# Patient Record
Sex: FEMALE | Race: BLACK OR AFRICAN AMERICAN | Employment: FULL TIME | URBAN - METROPOLITAN AREA
[De-identification: names, ages, dates, MRNs, and addresses within clinical notes are randomized per-mention and may not be internally consistent; named-entity substitution may affect disease eponyms.]

---

## 2018-04-13 ENCOUNTER — OFFICE VISIT (OUTPATIENT)
Dept: FAMILY MEDICINE CLINIC | Facility: CLINIC | Age: 38
End: 2018-04-13

## 2018-04-13 VITALS
HEART RATE: 105 BPM | SYSTOLIC BLOOD PRESSURE: 127 MMHG | DIASTOLIC BLOOD PRESSURE: 75 MMHG | BODY MASS INDEX: 50.02 KG/M2 | WEIGHT: 293 LBS | OXYGEN SATURATION: 98 % | HEIGHT: 64 IN | TEMPERATURE: 98.8 F | RESPIRATION RATE: 16 BRPM

## 2018-04-13 DIAGNOSIS — J30.2 PERENNIAL ALLERGIC RHINITIS WITH SEASONAL VARIATION: ICD-10-CM

## 2018-04-13 DIAGNOSIS — J30.89 PERENNIAL ALLERGIC RHINITIS WITH SEASONAL VARIATION: ICD-10-CM

## 2018-04-13 DIAGNOSIS — M54.50 ACUTE BILATERAL LOW BACK PAIN WITHOUT SCIATICA: ICD-10-CM

## 2018-04-13 DIAGNOSIS — E66.01 MORBID OBESITY WITH BMI OF 50.0-59.9, ADULT (HCC): ICD-10-CM

## 2018-04-13 DIAGNOSIS — B34.9 VIRAL SYNDROME: Primary | ICD-10-CM

## 2018-04-13 RX ORDER — CETIRIZINE HCL 10 MG
10 TABLET ORAL DAILY
Qty: 90 TAB | Refills: 3 | Status: SHIPPED | OUTPATIENT
Start: 2018-04-13 | End: 2021-03-02 | Stop reason: ALTCHOICE

## 2018-04-13 NOTE — PATIENT INSTRUCTIONS
Body Mass Index: Care Instructions  Your Care Instructions    Body mass index (BMI) can help you see if your weight is raising your risk for health problems. It uses a formula to compare how much you weigh with how tall you are. · A BMI lower than 18.5 is considered underweight. · A BMI between 18.5 and 24.9 is considered healthy. · A BMI between 25 and 29.9 is considered overweight. A BMI of 30 or higher is considered obese. If your BMI is in the normal range, it means that you have a lower risk for weight-related health problems. If your BMI is in the overweight or obese range, you may be at increased risk for weight-related health problems, such as high blood pressure, heart disease, stroke, arthritis or joint pain, and diabetes. If your BMI is in the underweight range, you may be at increased risk for health problems such as fatigue, lower protection (immunity) against illness, muscle loss, bone loss, hair loss, and hormone problems. BMI is just one measure of your risk for weight-related health problems. You may be at higher risk for health problems if you are not active, you eat an unhealthy diet, or you drink too much alcohol or use tobacco products. Follow-up care is a key part of your treatment and safety. Be sure to make and go to all appointments, and call your doctor if you are having problems. It's also a good idea to know your test results and keep a list of the medicines you take. How can you care for yourself at home? · Practice healthy eating habits. This includes eating plenty of fruits, vegetables, whole grains, lean protein, and low-fat dairy. · If your doctor recommends it, get more exercise. Walking is a good choice. Bit by bit, increase the amount you walk every day. Try for at least 30 minutes on most days of the week. · Do not smoke. Smoking can increase your risk for health problems. If you need help quitting, talk to your doctor about stop-smoking programs and medicines. These can increase your chances of quitting for good. · Limit alcohol to 2 drinks a day for men and 1 drink a day for women. Too much alcohol can cause health problems. If you have a BMI higher than 25  · Your doctor may do other tests to check your risk for weight-related health problems. This may include measuring the distance around your waist. A waist measurement of more than 40 inches in men or 35 inches in women can increase the risk of weight-related health problems. · Talk with your doctor about steps you can take to stay healthy or improve your health. You may need to make lifestyle changes to lose weight and stay healthy, such as changing your diet and getting regular exercise. If you have a BMI lower than 18.5  · Your doctor may do other tests to check your risk for health problems. · Talk with your doctor about steps you can take to stay healthy or improve your health. You may need to make lifestyle changes to gain or maintain weight and stay healthy, such as getting more healthy foods in your diet and doing exercises to build muscle. Where can you learn more? Go to http://kang-alberto.info/. Enter S176 in the search box to learn more about \"Body Mass Index: Care Instructions. \"  Current as of: October 13, 2016  Content Version: 11.4  © 4385-0545 [employer]. Care instructions adapted under license by [employer] (which disclaims liability or warranty for this information). If you have questions about a medical condition or this instruction, always ask your healthcare professional. [address] any warranty or liability for your use of this information.

## 2018-04-13 NOTE — PROGRESS NOTES
HISTORY OF PRESENT ILLNESS  Leonarda Buchanan is a 41 y.o. female. HPI Comments: Complains of bilateral back pain for the past 2 days. She had N/V/D 4 days ago, possibly with fever, but this resolved without treatment - she may have had an ill contact. She calls this back pain \"kidney pain,\" but denies dysuria, frequency or urgency. No longer feels febrile - no abdominal pain now. No medications for this. She needs a refill of Zyrtec for her allergies. Back Pain    Pertinent negatives include no chest pain, no fever, no headaches and no dysuria. Past Medical History:   Diagnosis Date    Allergic rhinitis     GERD (gastroesophageal reflux disease)     Vertigo        Past Surgical History:   Procedure Laterality Date    HX KNEE ARTHROSCOPY  2009    right knee    HX TONSILLECTOMY         History   Smoking Status    Former Smoker    Quit date: 10/9/2012   Smokeless Tobacco    Never Used     Current Outpatient Prescriptions   Medication Sig    cetirizine (ZYRTEC) 10 mg tablet Take 1 Tab by mouth daily. Indications: PERENNIAL ALLERGIC RHINITIS    meclizine (ANTIVERT) 25 mg tablet Take 1 Tab by mouth three (3) times daily as needed. Indications: VERTIGO     No current facility-administered medications for this visit. Review of Systems   Constitutional: Negative for chills and fever. HENT: Negative for congestion and sore throat. Respiratory: Negative for cough and shortness of breath. Cardiovascular: Negative for chest pain. Gastrointestinal: Positive for diarrhea, nausea and vomiting. Genitourinary: Negative for dysuria, frequency, hematuria and urgency. Musculoskeletal: Positive for back pain. Neurological: Negative for headaches. Endo/Heme/Allergies: Positive for environmental allergies.      Visit Vitals    /75    Pulse (!) 105    Temp 98.8 °F (37.1 °C) (Oral)    Resp 16    Ht 5' 4\" (1.626 m)    Wt 299 lb 12.8 oz (136 kg)    SpO2 98%    BMI 51.46 kg/m2 Physical Exam   Constitutional: She is oriented to person, place, and time. She appears well-developed and well-nourished. No distress. Neck: Neck supple. No thyromegaly present. Cardiovascular: Normal rate and regular rhythm. Exam reveals no gallop and no friction rub. No murmur heard. Pulmonary/Chest: Effort normal and breath sounds normal. No respiratory distress. Abdominal: Soft. She exhibits no mass. There is no tenderness. There is no CVA tenderness. Musculoskeletal:        Lumbar back: She exhibits normal range of motion, no tenderness and no spasm. Lymphadenopathy:     She has no cervical adenopathy. Neurological: She is alert and oriented to person, place, and time. Skin: Skin is warm and dry. Psychiatric: She has a normal mood and affect. Her behavior is normal. Judgment and thought content normal.       ASSESSMENT and PLAN    ICD-10-CM ICD-9-CM    1. Viral syndrome B34.9 079.99    2. Perennial allergic rhinitis with seasonal variation J30.89 477.9 cetirizine (ZYRTEC) 10 mg tablet    J30.2     3. Morbid obesity with BMI of 50.0-59.9, adult ([facility]) E66.01 278.01     Z68.43 V85.43    4. Acute bilateral low back pain without sciatica M54.5 724.2 CULTURE, URINE     338.19      Follow-up Disposition:  Return in about 6 months (around 10/13/2018). the following changes in treatment are made: I suspect that her back pain is residual myalgia from her viral infection rather than UTI, but will send urine culture and treat if positive. Push fluids, OTC pain control prn. Refilled Zyrtec.  lab results and schedule of future lab studies reviewed with patient  reviewed diet, exercise and weight control  reviewed medications and side effects in detail    Discussed the patient's BMI with her.   The BMI follow up plan is as follows:     dietary management education, guidance, and counseling  encourage exercise  monitor weight  prescribed dietary intake    An After Visit Summary was printed and given to the patient. Plan of care reviewed - patient verbalize(s) understanding and agreement.

## 2018-04-14 LAB — RESULT: NORMAL

## 2018-04-17 ENCOUNTER — OFFICE VISIT (OUTPATIENT)
Dept: FAMILY MEDICINE CLINIC | Facility: CLINIC | Age: 38
End: 2018-04-17

## 2018-04-17 VITALS
BODY MASS INDEX: 50.02 KG/M2 | OXYGEN SATURATION: 100 % | DIASTOLIC BLOOD PRESSURE: 76 MMHG | RESPIRATION RATE: 16 BRPM | TEMPERATURE: 98.1 F | SYSTOLIC BLOOD PRESSURE: 130 MMHG | WEIGHT: 293 LBS | HEIGHT: 64 IN | HEART RATE: 73 BPM

## 2018-04-17 DIAGNOSIS — R20.2 PARESTHESIA OF BOTH FEET: Primary | ICD-10-CM

## 2018-04-17 NOTE — PROGRESS NOTES
HISTORY OF PRESENT ILLNESS  Howard Horta is a 41 y.o. female. HPI Comments: Presents with 1 week history of numbness, tingling, burning and cold sensation in the soles of both feet. This is worse at night, but occurs sometimes during the day. No affected by weight bearing. No redness or swelling. No previous similar problems. No medications for this. Cold extremity   Pertinent negatives include no chest pain, no headaches and no shortness of breath. Tingling   Pertinent negatives include no chest pain, no headaches and no shortness of breath. Past Medical History:   Diagnosis Date    Allergic rhinitis     GERD (gastroesophageal reflux disease)     Vertigo        Past Surgical History:   Procedure Laterality Date    HX KNEE ARTHROSCOPY  2009    right knee    HX TONSILLECTOMY         History   Smoking Status    Former Smoker    Quit date: 10/9/2012   Smokeless Tobacco    Never Used     Current Outpatient Prescriptions   Medication Sig    cetirizine (ZYRTEC) 10 mg tablet Take 1 Tab by mouth daily. Indications: PERENNIAL ALLERGIC RHINITIS     No current facility-administered medications for this visit. Review of Systems   Constitutional: Negative for chills and fever. Respiratory: Negative for shortness of breath. Cardiovascular: Negative for chest pain. Gastrointestinal: Negative for nausea and vomiting. Musculoskeletal: Negative for back pain and neck pain. Skin: Negative for itching and rash. Neurological: Positive for dizziness (mild vertigo) and tingling. Negative for focal weakness and headaches. Visit Vitals    /76    Pulse 73    Temp 98.1 °F (36.7 °C)    Resp 16    Ht 5' 4\" (1.626 m)    Wt 301 lb (136.5 kg)    LMP 04/12/2018    SpO2 100%    BMI 51.67 kg/m2       Physical Exam   Constitutional: She is oriented to person, place, and time. She appears well-developed and well-nourished. No distress. Neck: Neck supple. No thyromegaly present. Cardiovascular: Normal rate, regular rhythm and intact distal pulses. Exam reveals no gallop and no friction rub. No murmur heard. Pulmonary/Chest: Effort normal and breath sounds normal. No respiratory distress. Musculoskeletal: She exhibits no edema. Feet appear normal   Lymphadenopathy:     She has no cervical adenopathy. Neurological: She is alert and oriented to person, place, and time. Skin: Skin is warm and dry. Psychiatric: She has a normal mood and affect. Her behavior is normal. Judgment and thought content normal.       ASSESSMENT and PLAN    ICD-10-CM ICD-9-CM    1. Paresthesia of both feet R20.2 782.0 VITAMIN B12 & FOLATE      HEMOGLOBIN A1C WITH EAG      TSH 3RD GENERATION      REFERRAL TO PODIATRY     Follow-up Disposition:  Return if symptoms worsen or fail to improve.  lab results and schedule of future lab studies reviewed with patient  Referral to Podiatry (rule out tarsal tunnel syndrome). Plan of care reviewed - patient verbalize(s) understanding and agreement.

## 2018-04-17 NOTE — PATIENT INSTRUCTIONS
Numbness and Tingling: Care Instructions  Your Care Instructions    Many things can cause numbness or tingling. Swelling may put pressure on a nerve. This could cause you to lose feeling or have a pins-and-needles sensation on part of your body. Nerves may be damaged from trauma, toxins, or diseases, such as diabetes or multiple sclerosis (MS). Sometimes, though, the cause is not clear. If there is no clear reason for your symptoms, and you are not having any other symptoms, your doctor may suggest watching and waiting for a while to see if the numbness or tingling goes away on its own. Your doctor may want you to have blood or nerve tests to find the cause of your symptoms. Follow-up care is a key part of your treatment and safety. Be sure to make and go to all appointments, and call your doctor if you are having problems. It's also a good idea to know your test results and keep a list of the medicines you take. How can you care for yourself at home? · If your doctor prescribes medicine, take it exactly as directed. Call your doctor if you think you are having a problem with your medicine. · If you have any swelling, put ice or a cold pack on the area for 10 to 20 minutes at a time. Put a thin cloth between the ice and your skin. When should you call for help? Call 911 anytime you think you may need emergency care. For example, call if:  ? · You have weakness, numbness, or tingling in both legs. ? · You lose bowel or bladder control. ? · You have symptoms of a stroke. These may include:  ¨ Sudden numbness, tingling, weakness, or loss of movement in your face, arm, or leg, especially on only one side of your body. ¨ Sudden vision changes. ¨ Sudden trouble speaking. ¨ Sudden confusion or trouble understanding simple statements. ¨ Sudden problems with walking or balance. ¨ A sudden, severe headache that is different from past headaches. ? Watch closely for changes in your health, and be sure to contact your doctor if you have any problems, or if:  ? · You do not get better as expected. Where can you learn more? Go to http://kang-alberto.info/. Enter V879 in the search box to learn more about \"Numbness and Tingling: Care Instructions. \"  Current as of: October 14, 2016  Content Version: 11.4  © 3898-3903 [facility]. Care instructions adapted under license by [employer] (which disclaims liability or warranty for this information). If you have questions about a medical condition or this instruction, always ask your healthcare professional. [place] [address] any warranty or liability for your use of this information.

## 2018-04-18 ENCOUNTER — TELEPHONE (OUTPATIENT)
Dept: FAMILY MEDICINE CLINIC | Facility: CLINIC | Age: 38
End: 2018-04-18

## 2018-06-26 ENCOUNTER — OFFICE VISIT (OUTPATIENT)
Dept: FAMILY MEDICINE CLINIC | Facility: CLINIC | Age: 38
End: 2018-06-26

## 2018-06-26 VITALS
RESPIRATION RATE: 18 BRPM | WEIGHT: 293 LBS | TEMPERATURE: 98.3 F | HEIGHT: 64 IN | HEART RATE: 89 BPM | DIASTOLIC BLOOD PRESSURE: 80 MMHG | SYSTOLIC BLOOD PRESSURE: 125 MMHG | BODY MASS INDEX: 50.02 KG/M2 | OXYGEN SATURATION: 97 %

## 2018-06-26 DIAGNOSIS — Z02.0 SCHOOL PHYSICAL EXAM: Primary | ICD-10-CM

## 2018-06-26 DIAGNOSIS — Z11.1 SCREENING-PULMONARY TB: ICD-10-CM

## 2018-06-26 NOTE — PROGRESS NOTES
HISTORY OF PRESENT ILLNESS  Rui Calderon is a 41 y.o. female. 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 She is required to have a PPD for this. 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 She is feeling well, taking only her allergy medication. Non-smoker. Physical   Pertinent negatives include no chest pain, no headaches and no shortness of breath. PPD Reading   Pertinent negatives include no chest pain, no headaches and no shortness of breath. Past Medical History:   Diagnosis Date    Allergic rhinitis     GERD (gastroesophageal reflux disease)     Vertigo        Past Surgical History:   Procedure Laterality Date    HX KNEE ARTHROSCOPY  2009    right knee    HX TONSILLECTOMY         History   Smoking Status    Former Smoker    Quit date: 10/9/2012   Smokeless Tobacco    Never Used     Current Outpatient Prescriptions   Medication Sig    cetirizine (ZYRTEC) 10 mg tablet Take 1 Tab by mouth daily. Indications: PERENNIAL ALLERGIC RHINITIS     No current facility-administered medications for this visit. Review of Systems   Constitutional: Negative for chills and fever. Respiratory: Negative for cough and shortness of breath. Cardiovascular: Negative for chest pain. Gastrointestinal: Negative for nausea and vomiting. Musculoskeletal: Negative for myalgias. Skin: Negative for itching and rash. Neurological: Negative for dizziness and headaches. Visit Vitals    /80 (BP 1 Location: Right arm, BP Patient Position: Sitting)    Pulse 89    Temp 98.3 °F (36.8 °C) (Oral)    Resp 18    Ht 5' 4\" (1.626 m)    Wt 298 lb (135.2 kg)    SpO2 97%    BMI 51.15 kg/m2       Physical Exam   Constitutional: She is oriented to person, place, and time. She appears well-developed and well-nourished. No distress. Neck: Neck supple. No thyromegaly present.    Cardiovascular: Normal rate, regular rhythm and intact distal pulses. Exam reveals no gallop and no friction rub. No murmur heard. Pulmonary/Chest: Effort normal and breath sounds normal. No respiratory distress. Musculoskeletal: She exhibits no edema. Lymphadenopathy:     She has no cervical adenopathy. Neurological: She is alert and oriented to person, place, and time. Skin: Skin is warm and dry. Psychiatric: She has a normal mood and affect. Her behavior is normal. Judgment and thought content normal.       ASSESSMENT and PLAN    ICD-10-CM ICD-9-CM    1. School physical exam Z02.0 V70.5    2. Screening-pulmonary TB Z11.1 V74.1 AMB POC TUBERCULOSIS, INTRADERMAL (SKIN TEST)     Follow-up Disposition:  Return if symptoms worsen or fail to improve. 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 reviewed diet, exercise and weight control  Plan of care reviewed - patient verbalize(s) understanding and agreement.

## 2018-06-26 NOTE — PROGRESS NOTES
Chief Complaint   Patient presents with    Physical     for work    PPD Reading     request to get PPD       Pt preferred language for health care discussion is english. Is someone accompanying this pt? no    Is the patient using any DME equipment during OV? no    Depression Screening:  PHQ over the last two weeks 6/26/2018   Little interest or pleasure in doing things Not at all   Feeling down, depressed or hopeless Not at all   Total Score PHQ 2 0       Learning Assessment:  Learning Assessment 6/26/2018   PRIMARY LEARNER Patient   HIGHEST LEVEL OF EDUCATION - PRIMARY LEARNER  -   BARRIERS PRIMARY LEARNER -   CO-LEARNER CAREGIVER -   PRIMARY LANGUAGE ENGLISH   LEARNER PREFERENCE PRIMARY DEMONSTRATION   ANSWERED BY patient   RELATIONSHIP SELF       Health Maintenance reviewed and discussed per provider. Yes    Pt is due for There are no preventive care reminders to display for this patient. .  Please order/place referral if appropriate. Coordination of Care:  1. Have you been to the ER, urgent care clinic since your last visit? Hospitalized since your last visit? n0    2. Have you seen or consulted any other health care providers outside of the [address] [place] [address] since your last visit? Include any pap smears or colon screening. Yes, patient state, \"I've gone to GYN at [address] \"

## 2018-06-26 NOTE — PATIENT INSTRUCTIONS
Tuberculin Skin Test: Care Instructions  Your Care Instructions    Tuberculosis (TB) is a bacterial infection that can damage the lungs or other parts of the body. The TB skin test can tell if you have TB bacteria in your body. Many people are exposed to TB and test positive for TB bacteria in their bodies, but they don't get the disease. TB bacteria can stay in your body without making you sick. This is because your immune system can keep TB in check. Your doctor may want you to have a TB skin test if you have been in close contact with someone who has TB. Or you may need the test if you have symptoms that might be caused by TB, such as a cough that does not go away, a fever, or weight loss. You also may get the test if you are a health care worker. During the skin test, part of a TB bacterium is injected under your skin. The test will feel like a skin prick. If you have TB bacteria in your body, a firm red bump will form at the shot site within 2 days. If the test shows that you are infected with TB (positive), your doctor probably will order more tests. A TB-positive skin test can't tell when you became infected with TB. And it can't tell whether the infection can be passed to others. Follow-up care is a key part of your treatment and safety. Be sure to make and go to all appointments, and call your doctor if you are having problems. It's also a good idea to know your test results and keep a list of the medicines you take. How can you care for yourself at home? · Do not scratch the test site. Scratching it may cause redness or swelling. This could affect the test results. · To ease itching, put a cold washcloth on the site. Then pat the site dry. · Do not cover the test site with a bandage or other dressing. · Go back to your doctor's office or hospital to have the test read on the follow-up date. This must be done between 48 and 72 hours after you get the shot. When should you call for help?   Watch closely for changes in your health, and be sure to contact your doctor if you have any problems. Where can you learn more? Go to http://kang-alberto.info/. Enter [phone] in the search box to learn more about \"Tuberculin Skin Test: Care Instructions. \"  Current as of: March 3, 2017  Content Version: 11.4  © 5560-8459 [employer]. Care instructions adapted under license by [employer] (which disclaims liability or warranty for this information). If you have questions about a medical condition or this instruction, always ask your healthcare professional. [place] [address] any warranty or liability for your use of this information.

## 2018-06-29 ENCOUNTER — CLINICAL SUPPORT (OUTPATIENT)
Dept: FAMILY MEDICINE CLINIC | Facility: CLINIC | Age: 38
End: 2018-06-29

## 2018-06-29 DIAGNOSIS — Z11.1 SCREENING-PULMONARY TB: Primary | ICD-10-CM

## 2018-06-29 LAB
MM INDURATION POC: 0 MM (ref 0–5)
PPD POC: NORMAL NEGATIVE

## 2018-06-29 NOTE — LETTER
6/29/2018 10:04 AM 
 
Ms. Calvin Lebron [address] [facility] [address] I performed a physical exam on 6/26/18, and a skin test for TB was placed. This test was read today (negative). She is free from contagious disease and fit for employment. Sincerely, Renan PURDY Nurse

## 2018-12-05 ENCOUNTER — OFFICE VISIT (OUTPATIENT)
Dept: FAMILY MEDICINE CLINIC | Facility: CLINIC | Age: 38
End: 2018-12-05

## 2018-12-05 VITALS
BODY MASS INDEX: 50.02 KG/M2 | HEART RATE: 94 BPM | HEIGHT: 64 IN | TEMPERATURE: 97.9 F | SYSTOLIC BLOOD PRESSURE: 133 MMHG | WEIGHT: 293 LBS | RESPIRATION RATE: 16 BRPM | OXYGEN SATURATION: 90 % | DIASTOLIC BLOOD PRESSURE: 90 MMHG

## 2018-12-05 DIAGNOSIS — J06.9 VIRAL UPPER RESPIRATORY TRACT INFECTION: Primary | ICD-10-CM

## 2018-12-05 NOTE — LETTER
[facility] 
[facility] MAIN OFFICE 
[address] [place] 
[phone] Work/School Note Date: 12/5/2018 To Whom It May concern: 
 
Leydi Romero was seen and treated today in the office Leydi Romero may return to work on 12/07/2018. Sincerely, Karo Hurtado MD

## 2018-12-05 NOTE — PROGRESS NOTES
HISTORY OF PRESENT ILLNESS Dixie Coombs is a 41 y.o. female. This is a 41year old female  Presenting with 3 days of a sore throat that progressed to cough over one day with, minimal mucous or phlegm production. 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 Negative for fever, malaise/fatigue and weight loss. HENT: Negative. Eyes: Negative for blurred vision, double vision and photophobia. Respiratory: Negative. Cardiovascular: Negative for chest pain, palpitations and orthopnea. Gastrointestinal:  
     There is no history of nausea The patient denies vomiting There is no history of diarrhea or constipation The patient denies heartburn Skin: Negative. Neurological: Negative. Negative for weakness. Psychiatric/Behavioral: Negative. Physical Exam  
Constitutional: She is oriented to person, place, and time. She appears well-developed and well-nourished. HENT:  
Head: Normocephalic and atraumatic. Right Ear: External ear normal.  
Left Ear: External ear normal.  
Nose: Nose normal.  
Mouth/Throat: Oropharynx is clear and moist.  
Eyes: Conjunctivae and EOM are normal. Pupils are equal, round, and reactive to light. Neck: Normal range of motion. Neck supple. No JVD present. No tracheal deviation present. No thyromegaly present. Cardiovascular: Regular rhythm, normal heart sounds and intact distal pulses. Pulmonary/Chest: Effort normal. No stridor. No respiratory distress. She has no wheezes. She has no rales. She exhibits no tenderness. Frequent cough,nasal congestion Abdominal: Soft.  Bowel sounds are normal. She exhibits no distension and no mass. There is no tenderness. There is no rebound and no guarding. Musculoskeletal: Normal range of motion. She exhibits no edema. Lymphadenopathy:  
  She has no cervical adenopathy. Neurological: She is alert and oriented to person, place, and time. Skin: Skin is warm and dry. No rash noted. No erythema. Psychiatric: She has a normal mood and affect. Her behavior is normal. Judgment normal.  
Nursing note and vitals reviewed. MDM Number of Diagnoses or Management Options Viral upper respiratory tract infection: new, no workup Risk of Complications, Morbidity, and/or Mortality Presenting problems: low ASSESSMENT and PLAN 
  ICD-10-CM ICD-9-CM 1. Viral upper respiratory tract infection J06.9 465.9   
 symptomatic treatment Off work letter Follow-up Disposition: 
Return in about 3 months (around 3/5/2019), or if symptoms worsen or fail to improve.

## 2018-12-05 NOTE — PROGRESS NOTES
Chief Complaint Patient presents with  Cold Symptoms  
  pt presents for cough and congestion pt states \" that it started with sore throat on Saturday sore throat is gone just congestion and cough \"

## 2018-12-05 NOTE — LETTER
[facility] 
[facility] MAIN OFFICE 
[facility] [address] [place] 
[phone] Work/School Note Date: 12/5/2018 To Whom It May concern: 
 
Lulú Sim was seen and treated today in the office. Lulú Sim may return to work on 12/08/2018. Sincerely, Elsy Hunter MD

## 2018-12-05 NOTE — PATIENT INSTRUCTIONS

## 2020-08-30 NOTE — PROGRESS NOTES
Consent: Diana Maier, who was seen by synchronous (real-time) audio-video technology, and/or her healthcare decision maker, is aware that this patient-initiated, Telehealth encounter on 8/31/2020 is a billable service, with coverage as determined by her insurance carrier. She is aware that she may receive a bill and has provided verbal consent to proceed: Yes. The patient was at home and I was at the offices of the [address] [place] [address] no one else participated in the service. ASSESSMENT and PLAN    ICD-10-CM ICD-9-CM    1. Morbid obesity with BMI of 50.0-59.9, adult ([facility]  E66.01 278.01     Z68.43 V85.43     We discussed diet and exercise. 2. Skin rash  R21 782.1 ketoconazole (NIZORAL) 2 % shampoo      triamcinolone (ARISTOCORT) 0.5 % topical cream    Aveeno, Nizoral daily Aristocort cream twice daily. Call in 1 week if not improved. Differential diagnosis for skin rash includes seborrhea, contact reaction doubt psoriasis or other etiologies. .  Health Maintenance Due   Topic Date Due    PAP AKA CERVICAL CYTOLOGY  11/16/2019     [id number]  Subjective:   Diana Maier is a 47 y.o. female who was seen for follow-up. Skin rash  Today she has concerns about her skin breaking out. The onset was October. She describes it as a flaking sensation. It is primarily on the face and the neck. No flexural or extensor area discomforts. There is no history of eczema or history of psoriasis. She has not changed anything since October. She has however tried several things including Aveeno and some exfoliates. She states the dose so causes burning. She has no known allergies. She works as a massage therapist.  No fever or chills are admitted to. Overweight  The weight has remained the same. She is added tissue oils and all of oil. We discussed diet. body mass index is unknown because there is no height or weight on file.   Wt Readings from Last 3 Encounters:   12/05/18 294 lb (133.4 kg)   06/26/18 298 lb (135.2 kg)   04/17/18 301 lb (136.5 kg)     Key Obesity Meds     Patient is on no anti-obesity meds. Current Outpatient Medications   Medication Sig    cetirizine (ZYRTEC) 10 mg tablet Take 1 Tab by mouth daily. Indications: PERENNIAL ALLERGIC RHINITIS     No current facility-administered medications for this visit. Allergies   Allergen Reactions    Peanut Itching and Swelling    Tree Nut Itching and Swelling     has Morbid obesity with BMI of 50.0-59.9, adult (HCC), GERD (gastroesophageal reflux disease), and Perennial allergic rhinitis with seasonal variation on their problem list.  Past Surgical History:   Procedure Laterality Date    HX KNEE ARTHROSCOPY  2009    right knee    HX TONSILLECTOMY       Relationships   Social connections    Talks on phone: Not on file    Gets together: Not on file    Attends Latter day service: Not on file    Active member of club or organization: Not on file    Attends meetings of clubs or organizations: Not on file    Relationship status: Not on file     family history includes Asthma in her mother; Diabetes in her mother; High Cholesterol in her mother; Hypertension in her mother; Obesity in her mother. Review of Systems   Constitutional: Negative for chills and fever. HENT: Negative. Eyes: Negative for blurred vision, double vision, photophobia and redness. Respiratory: Negative. Negative for shortness of breath and wheezing. Cardiovascular: Negative for orthopnea and leg swelling. Gastrointestinal: Negative for constipation and diarrhea. There is no history of nausea  The patient denies vomiting  There is no history of diarrhea or constipation  The patient denies heartburn     Genitourinary: Negative for dysuria and urgency. Musculoskeletal: Negative for myalgias. Skin: Positive for itching and rash. Neurological: Negative.   Negative for dizziness, sensory change, speech change, focal weakness and weakness. Endo/Heme/Allergies: Does not bruise/bleed easily. Psychiatric/Behavioral: Negative. The patient is not nervous/anxious. Physical Exam  Constitutional:       General: She is not in acute distress. HENT:      Right Ear: External ear normal.      Left Ear: External ear normal.      Mouth/Throat:      Mouth: Mucous membranes are moist.      Pharynx: Oropharynx is clear. Eyes:      General: No scleral icterus. Pupils: Pupils are equal, round, and reactive to light. Neck:      Musculoskeletal: No neck rigidity. Pulmonary:      Effort: Pulmonary effort is normal.   Musculoskeletal:         General: No swelling. Right lower leg: No edema. Left lower leg: No edema. Skin:     General: Skin is dry. Coloration: Skin is not pale. Findings: No erythema. Comments: Fine scaling of the face and some slight hyperpigmentation of the neck bilaterally. Flexural creases are normal.   Neurological:      Mental Status: She is alert and oriented to person, place, and time. Gait: Gait normal.   Psychiatric:         Mood and Affect: Mood normal.         Behavior: Behavior normal.         Thought Content: Thought content normal.        Results for orders placed or performed in visit on 06/26/18   AMB POC TUBERCULOSIS, INTRADERMAL (SKIN TEST)   Result Value Ref Range    PPD  Negative    mm Induration 0 mm     No results found for any visits on 08/31/20. We discussed the expected course, resolution and complications of the diagnosis(es) in detail. Medication risks, benefits, costs, interactions, and alternatives were discussed as indicated. I advised her to contact the office if her condition worsens, changes or fails to improve as anticipated. She expressed understanding with the diagnosis(es) and plan. Claude Doom is a 47 y.o. female being evaluated by a video visit encounter for concerns as above. A caregiver was present when appropriate.  Due to this being a TeleHealth encounter (During AFHTW-31 public health emergency), evaluation of the following organ systems was limited: Vitals/Constitutional/EENT/Resp/CV/GI//MS/Neuro/Skin/Heme-Lymph-Imm. Pursuant to the emergency declaration under the [facility], [facility] waiver authority and the [employer] and [employer] Act, this Virtual  Visit was conducted, with patient's (and/or legal guardian's) consent, to reduce the patient's risk of exposure to COVID-19 and provide necessary medical care. Arnav Pérez MD    This note was done with the assistance of dragon speech software.   Some inadvertent errors or omissions may be present

## 2020-08-31 ENCOUNTER — VIRTUAL VISIT (OUTPATIENT)
Dept: FAMILY MEDICINE CLINIC | Facility: CLINIC | Age: 40
End: 2020-08-31

## 2020-08-31 DIAGNOSIS — E66.01 MORBID OBESITY WITH BMI OF 50.0-59.9, ADULT (HCC): Primary | ICD-10-CM

## 2020-08-31 DIAGNOSIS — R21 SKIN RASH: ICD-10-CM

## 2020-08-31 RX ORDER — KETOCONAZOLE 20 MG/ML
SHAMPOO TOPICAL
Qty: 120 ML | Refills: 5 | Status: SHIPPED | OUTPATIENT
Start: 2020-08-31 | End: 2021-08-02

## 2020-08-31 RX ORDER — TRIAMCINOLONE ACETONIDE 5 MG/G
CREAM TOPICAL 2 TIMES DAILY
Qty: 60 G | Refills: 3 | Status: SHIPPED | OUTPATIENT
Start: 2020-08-31 | End: 2021-08-02

## 2021-03-02 ENCOUNTER — VIRTUAL VISIT (OUTPATIENT)
Dept: FAMILY MEDICINE CLINIC | Age: 41
End: 2021-03-02
Payer: COMMERCIAL

## 2021-03-02 DIAGNOSIS — J06.9 UPPER RESPIRATORY TRACT INFECTION, UNSPECIFIED TYPE: Primary | ICD-10-CM

## 2021-03-02 DIAGNOSIS — R21 SKIN RASH: ICD-10-CM

## 2021-03-02 DIAGNOSIS — E66.01 MORBID OBESITY WITH BMI OF 50.0-59.9, ADULT (HCC): ICD-10-CM

## 2021-03-02 PROCEDURE — 99443 PR PHYS/QHP TELEPHONE EVALUATION 21-30 MIN: CPT | Performed by: EMERGENCY MEDICINE

## 2021-03-02 RX ORDER — SODIUM SULFIDE 10 MG/G
15 GEL TOPICAL 4 TIMES DAILY
Qty: 236 ML | Refills: 0 | Status: SHIPPED | OUTPATIENT
Start: 2021-03-02 | End: 2021-08-10

## 2021-03-02 NOTE — PROGRESS NOTES
Consent: Krupa Mckoy, who was evaluated by audio only technology, and/or her healthcare decision maker, is aware that this patient-initiated, Telehealth audio encounter on 3/2/2021 is a billable service, with coverage as determined by her insurance carrier. She is aware that she may receive a bill and has provided verbal consent to proceed: Yes. The patient was at home and I was at the offices of the [address] [place] [address] no one else participated in the service. This patient has upper respiratory tract symptoms. Treatment has been initiated and repeat Covid test has been ordered. She is quarantining. She has an unhealthy weight. We discussed diet and exercise at length. Consider medication or referral to bariatrics. ICD-10-CM ICD-9-CM    1. Upper respiratory tract infection, unspecified type  J06.9 465.9 NOVEL CORONAVIRUS (COVID-19)      doxylamine-DM-acetaminophen (Coricidin HBP Cold-Multi Sympt) 6.25- mg/15 mL liqd    Associated fever. Will recheck for Covid flu or other respiratory infection. Appointment made for the Covid clinic. Coricidin ordered. 2. Morbid obesity with BMI of 50.0-59.9, adult ([facility]  E66.01 278.01     Z68.43 V85.43     Weight increasing despite diet we discussed before. Once over the URI would trial phentermine. We discussed diet and exercise prior to starting the medication   3. Skin rash  R21 782.1     Resolved         lab results and schedule of future lab studies reviewed with patient  reviewed diet, exercise and weight control         Health Maintenance Due   Topic Date Due    Hepatitis C Screening  Never done    PAP AKA CERVICAL CYTOLOGY  11/16/2019    Flu Vaccine (1) Never done       Subjective:   Krupa Mckoy is a 46 y.o. female who is being seen in follow-up. .The patient has Morbid obesity with BMI of 50.0-59.9, adult ([address] GERD (gastroesophageal reflux disease), and Perennial allergic rhinitis with seasonal variation on their problem list..  The patient was seen in August 31, 2020 for unhealthy weight, a skin rash for which we tried Nizoral shampoo and Aristocort cream.  Upper respiratory tract infection  The patient presents with an upper respiratory infection. It is associated with a cough that is nonproductive. There is some associated chest pain. The patient is presently quarantining for 10 days. She works as a massage therapist.  Some nasal congestion is also admitted to. She has been sick for one week  She was tested for Covid Wednesday, negative, and a repeat was negative. Fever 99.9 to 101.6, night sweats and chills are admitted to. Taste wnl, no diarrhea admitted to. .  She has tried Mucinex with minimal relief. Overweight  The patient states that the weight has gone up 20 pounds. Patient's diet is high in saturated fats. .  The patient denies edema. Aggravating factors include quarantining. Associated symptoms include no joint aches or pains. No fatigability. No hair changes negative. [facility] BMI Readings from Last 3 Encounters:   12/05/18 50.46 kg/m²   06/26/18 51.15 kg/m²   04/17/18 51.67 kg/m²       Wt Readings from Last 3 Encounters:   12/05/18 294 lb (133.4 kg)   06/26/18 298 lb (135.2 kg)   04/17/18 301 lb (136.5 kg)         Current Outpatient Medications   Medication Sig    ketoconazole (NIZORAL) 2 % shampoo Shampoo head face and neck daily    triamcinolone (ARISTOCORT) 0.5 % topical cream Apply  to affected area two (2) times a day. use thin layer    cetirizine (ZYRTEC) 10 mg tablet Take 1 Tab by mouth daily. Indications: PERENNIAL ALLERGIC RHINITIS     No current facility-administered medications for this visit.       Allergies   Allergen Reactions    Peanut Itching and Swelling    Tree Nut Itching and Swelling     has Morbid obesity with BMI of 50.0-59.9, adult (HCC), GERD (gastroesophageal reflux disease), and Perennial allergic rhinitis with seasonal variation on their problem list.  Past Surgical History: Procedure Laterality Date    HX KNEE ARTHROSCOPY  2009    right knee    HX TONSILLECTOMY        reports that she quit smoking about 8 years ago. She has never used smokeless tobacco. She reports that she does not drink alcohol or use drugs. family history includes Asthma in her mother; Diabetes in her mother; High Cholesterol in her mother; Hypertension in her mother; Obesity in her mother. Review of Systems   Constitutional: Negative for chills and fever. HENT: Negative. Eyes: Negative for blurred vision, double vision, photophobia and redness. Respiratory: Positive for cough and shortness of breath. Negative for hemoptysis, sputum production and wheezing. Cardiovascular: Negative for orthopnea and leg swelling. Gastrointestinal: Negative for blood in stool, constipation and diarrhea. There is no history of nausea  The patient denies vomiting  There is no history of diarrhea or constipation  The patient denies heartburn     Genitourinary: Negative for dysuria and urgency. Musculoskeletal: Negative for myalgias. Skin: Negative for itching and rash. Neurological: Negative. Negative for dizziness, sensory change, speech change, focal weakness and weakness. Endo/Heme/Allergies: Does not bruise/bleed easily. Psychiatric/Behavioral: Negative. Negative for depression. The patient is not nervous/anxious. Vicki Lu   was evaluated through a patient-initiated, synchronous (real-time) audio only encounter, and/or her healthcare decision maker, is aware that it is a billable service, with coverage as determined by her insurance carrier. She provided verbal consent to proceed: Yes. She has not had a related appointment within my department in the past 7 days or scheduled within the next 24 hours. Total Time: minutes: 21-30 minutes    Alcon Aviles MD     We discussed the expected course, resolution and complications of the diagnosis(es) in detail.   Medication risks, benefits, costs, interactions, and alternatives were discussed as indicated. I advised her to contact the office if her condition worsens, changes or fails to improve as anticipated. She expressed understanding with the diagnosis(es) and plan. Vicki Lu is a 46 y.o. female being evaluated by a video visit encounter for concerns as above. A caregiver was present when appropriate. Due to this being a TeleHealth encounter (During SAVYH-42 public health emergency), evaluation of the following organ systems was limited: Vitals/Constitutional/EENT/Resp/CV/GI//MS/Neuro/Skin/Heme-Lymph-Imm. Pursuant to the emergency declaration under the [facility] waiver authority and the [employer] and [employer] Act, this Virtual  Visit was conducted, with patient's (and/or legal guardian's) consent, to reduce the patient's risk of exposure to COVID-19 and provide necessary medical care. This note was done with the assistance of dragon speech software.   Some inadvertent errors or omissions may be present

## 2021-03-03 ENCOUNTER — OFFICE VISIT (OUTPATIENT)
Dept: FAMILY MEDICINE CLINIC | Age: 41
End: 2021-03-03
Payer: COMMERCIAL

## 2021-03-03 VITALS
TEMPERATURE: 98.7 F | HEART RATE: 94 BPM | SYSTOLIC BLOOD PRESSURE: 125 MMHG | RESPIRATION RATE: 16 BRPM | DIASTOLIC BLOOD PRESSURE: 76 MMHG | OXYGEN SATURATION: 96 %

## 2021-03-03 DIAGNOSIS — R50.9 FEVER, UNSPECIFIED FEVER CAUSE: ICD-10-CM

## 2021-03-03 DIAGNOSIS — J40 BRONCHITIS: ICD-10-CM

## 2021-03-03 DIAGNOSIS — R05.9 COUGH: Primary | ICD-10-CM

## 2021-03-03 DIAGNOSIS — R52 BODY ACHES: ICD-10-CM

## 2021-03-03 LAB
FLUAV+FLUBV AG NOSE QL IA.RAPID: NEGATIVE
FLUAV+FLUBV AG NOSE QL IA.RAPID: NEGATIVE
S PYO AG THROAT QL: NEGATIVE
VALID INTERNAL CONTROL?: YES
VALID INTERNAL CONTROL?: YES

## 2021-03-03 PROCEDURE — 99213 OFFICE O/P EST LOW 20 MIN: CPT | Performed by: NURSE PRACTITIONER

## 2021-03-03 PROCEDURE — 87804 INFLUENZA ASSAY W/OPTIC: CPT | Performed by: NURSE PRACTITIONER

## 2021-03-03 PROCEDURE — 87880 STREP A ASSAY W/OPTIC: CPT | Performed by: NURSE PRACTITIONER

## 2021-03-03 RX ORDER — AZITHROMYCIN 250 MG/1
TABLET, FILM COATED ORAL
Qty: 6 TAB | Refills: 0 | Status: SHIPPED | OUTPATIENT
Start: 2021-03-03 | End: 2021-03-08

## 2021-03-03 NOTE — PROGRESS NOTES
SUBJECTIVE:  Chief Complaint   Patient presents with    Cough     started 2/23/21    Generalized Body Aches     chest tightness    Wheezing     tingling in feet and hands    Shortness of Breath    Chills     Patient denies recent travel. Pt exposed to sick contacts under investigations for possible Covid NO. Patient tested 2/4/2021 for covid but says she was negative. We will test again. At sometime in the future she could talk to PCP about testing for cough antibodies. Patient is obese and should see her PCP for routine blood work and A1C related to feet tingling. Pt is not a current smoker. OBJECTIVE    Visit Vitals  /76 (BP 1 Location: Right upper arm, BP Patient Position: Sitting)   Pulse 94   Temp 98.7 °F (37.1 °C) (Oral)   Resp 16   SpO2 96%      General:  healthy, alert, well developed, well nourished, cooperative, pleasant and in no apparent distress. Sick but not toxic appearing. Eyes:   The lids are without swelling, lesions, or drainage. The conjunctiva is clear and noninjected. ENT:  ENT exam normal, no neck nodes or sinus tenderness and bilateral TM normal without fluid or infection. Neck: normal, supple and no adenopathy. Lungs/CV: clear to auscultation, no wheezes or rales and unlabored breathing. Heart: regular rhythm normal rate. Skin:  No rashes, no jaundice. ASSESSMENT / PLAN     ICD-10-CM ICD-9-CM    1. Cough  R05 786.2 NOVEL CORONAVIRUS (COVID-19)      AMB POC RAPID STREP A      AMB POC RAPID INFLUENZA TEST      azithromycin (ZITHROMAX) 250 mg tablet   2. Body aches  R52 780.96 NOVEL CORONAVIRUS (COVID-19)      AMB POC RAPID STREP A      AMB POC RAPID INFLUENZA TEST      azithromycin (ZITHROMAX) 250 mg tablet   3. Fever, unspecified fever cause  R50.9 780.60 NOVEL CORONAVIRUS (COVID-19)      AMB POC RAPID STREP A      azithromycin (ZITHROMAX) 250 mg tablet   4.  Bronchitis  J40 490 azithromycin (ZITHROMAX) 250 mg tablet     Results for orders placed or performed in visit on 03/03/21   AMB POC RAPID STREP A   Result Value Ref Range    VALID INTERNAL CONTROL POC Yes     Group A Strep Ag Negative Negative   AMB POC RAPID INFLUENZA TEST   Result Value Ref Range    VALID INTERNAL CONTROL POC Yes     Influenza A Ag POC Negative Negative    Influenza B Ag POC Negative Negative     Based on CDC recommendations and limited testing supplies, only those patients who meet criteria will be tested for Covid. High priority groups for testing   Symptomatic and/or Exposure /Test for Covid  Immunocompromised host (on prednisone, biological therapy, blood cancer, metastatic cancer or active chemotherapy)   [facility] worker in the home    Other high-risk group: o age >47   o Uncontrolled DM   o Uncontrolled HTN   o BMI >40, CKD/ESRD    Dialysis patients (patients going to HD units, not asymptomatic home HD/PD)    Anyone living in a congregate setting     Non High-risk patient category           Test for COVID-19   Asymptomatic, no known exposure  No    Asymptomatic, possible exposure  No    Asymptomatic, definite exposure  Provider discretion    Symptomatic, no known exposure  Yes    Symptomatic, + exposure  Yes      Patient does  meet criteria for Covid testing. COVID-19 testing was completed. Work note was given until [employer] are available. If Covid testing was completed and is negative, patient may return back to work despite quarantine originally set in place if applicable. Instructed pt on the importance of rest, fluid intake (with avoidance of red fluids), zinc and vit C supplements. Instructed pt to check temp if possible and to take acetaminophen or NSAIDs if fevers are noted. Instructed patient to remember to wash hands, disinfect surroundings, and avoid touching face. Instructed pt to remain home and and self quarantine until Covid results are negative and all symptoms have improved or subsided. If they must leave home, wear a mask. Patient verbalized understanding. We have provided the patient with a detailed after visit summary which was reviewed, and red flag symptoms that would warrant an ER visit were emphasized. CC'd chart to PCP: Yes: Comment: MD Noemy Vanessa NP-CLAIRE    This visit was provided as a focused evaluation during the COVID -19 pandemic/national emergency. A comprehensive review of all previous patient history and testing was not conducted. Pertinent findings were elicited during the visit.

## 2021-03-03 NOTE — PROGRESS NOTES
Cristo Guardian presents today for   Chief Complaint   Patient presents with    Cough     started 2/23/21    Generalized Body Aches     chest tightness    Wheezing     tingling in feet and hands    Shortness of Breath    Chills       Is someone accompanying this pt? no    Is the patient using any DME equipment during OV? no    Travel and Exposure Screening was performed during check in or rooming process Yes  No      Depression Screening:  3 most recent PHQ Screens 3/3/2021   Little interest or pleasure in doing things Not at all   Feeling down, depressed, irritable, or hopeless Not at all   Total Score PHQ 2 0       Fall Risk  Fall Risk Assessment, last 12 mths 3/3/2021   Able to walk? Yes   Fall in past 12 months?  0       This Visit Test  Results for orders placed or performed in visit on 06/26/18   AMB POC TUBERCULOSIS, INTRADERMAL (SKIN TEST)   Result Value Ref Range    PPD  Negative    mm Induration 0 mm

## 2021-03-03 NOTE — LETTER
NOTIFICATION RETURN TO WORK / SCHOOL 
 
3/3/2021 2:24 PM 
 
Ms. Christin Frankel [address] [facility] [address] To Whom It May Concern: 
 
Christin Frankel is currently under the care of [address]. Patient under quarantine until 3/10/21. If there are questions or concerns please have the patient contact our office. Sincerely, Jonatan Allen NP

## 2021-03-04 LAB — SARS-COV-2, COV2NT: NOT DETECTED

## 2021-05-05 ENCOUNTER — VIRTUAL VISIT (OUTPATIENT)
Dept: FAMILY MEDICINE CLINIC | Age: 41
End: 2021-05-05
Payer: COMMERCIAL

## 2021-05-05 DIAGNOSIS — H01.004 BLEPHARITIS OF UPPER EYELIDS OF BOTH EYES, UNSPECIFIED TYPE: Primary | ICD-10-CM

## 2021-05-05 DIAGNOSIS — H01.001 BLEPHARITIS OF UPPER EYELIDS OF BOTH EYES, UNSPECIFIED TYPE: Primary | ICD-10-CM

## 2021-05-05 PROCEDURE — 99212 OFFICE O/P EST SF 10 MIN: CPT | Performed by: EMERGENCY MEDICINE

## 2021-05-05 NOTE — PROGRESS NOTES
Consent: Ny Mccormack, who was seen by synchronous (real-time) audio-video technology, and/or her healthcare decision maker, is aware that this patient-initiated, Telehealth encounter on 5/5/2021 is a billable service, with coverage as determined by her insurance carrier. She is aware that she may receive a bill and has provided verbal consent to proceed: Yes. The patient was at home and I was at the offices of the [address] [place] [address] no one else participated in the service. 05/05/21    ICD-10-CM ICD-9-CM    1. Blepharitis of upper eyelids of both eyes, unspecified type  H01.001 373.00     H01.004           Our patient has small papules at the edge of the eyelid. Is question blepharitis questionable meibomian gland inflammation or other etiologies. Will refer to ophthalmology. In addition we will have her continue on an antihistamine although she could most likely use Zyrtec or Claritin instead of the stronger Benadryl. We will continue the warm compresses and should wash the areas with baby shampoo twice a day. All questions were answered and understood. Health Maintenance Due   Topic Date Due    Hepatitis C Screening  Never done    COVID-19 Vaccine (1) Never done    PAP AKA CERVICAL CYTOLOGY  11/16/2019       Subjective:   Ny Mccormack is a 46 y.o. female. has Morbid obesity with BMI of 50.0-59.9, adult ([address] GERD (gastroesophageal reflux disease), and Perennial allergic rhinitis with seasonal variation on their problem list..    The patient was seen on03/02/21. The primary encounter diagnosis was Upper respiratory tract infection, unspecified type. Diagnoses of Morbid obesity with BMI of 50.0-59.9, adult (HCC) and Skin rash were also pertinent to this visit. .  Eyelid lesions  \"Blisters\" on eyelids, primarily the upper lids and she is losing her eyelashes. There are multiple small white papules. They been present for 1 month.   She tried warm compresses several times a day and has been using Benadryl for itching            Current Outpatient Medications   Medication Sig    doxylamine-DM-acetaminophen (Coricidin HBP Cold-Multi Sympt) 6.25- mg/15 mL liqd Take 15 mL by mouth four (4) times daily.  ketoconazole (NIZORAL) 2 % shampoo Shampoo head face and neck daily    triamcinolone (ARISTOCORT) 0.5 % topical cream Apply  to affected area two (2) times a day. use thin layer     No current facility-administered medications for this visit. Allergies   Allergen Reactions    Peanut Itching and Swelling    Tree Nut Itching and Swelling     has Morbid obesity with BMI of 50.0-59.9, adult (HCC), GERD (gastroesophageal reflux disease), and Perennial allergic rhinitis with seasonal variation on their problem list.  Past Surgical History:   Procedure Laterality Date    HX KNEE ARTHROSCOPY  2009    right knee    HX TONSILLECTOMY        reports that she quit smoking about 8 years ago. She has never used smokeless tobacco. She reports that she does not drink alcohol or use drugs. family history includes Asthma in her mother; Diabetes in her mother; High Cholesterol in her mother; Hypertension in her mother; Obesity in her mother. Review of Systems   Constitutional: Negative for chills and fever. HENT: Negative for ear pain. Eyes: Positive for blurred vision, pain and redness. Negative for discharge. Respiratory: Negative for shortness of breath. Cardiovascular: Negative for chest pain. Physical Exam  Constitutional:       General: She is not in acute distress. Appearance: She is obese. HENT:      Right Ear: External ear normal.      Left Ear: External ear normal.      Mouth/Throat:      Mouth: Mucous membranes are moist.      Pharynx: Oropharynx is clear. Eyes:      General: No scleral icterus. Pupils: Pupils are equal, round, and reactive to light. Comments: This is a video visit.   There appear to be small white papules at the edge of the tarsal plate. Some area of eyelid drop off noted bilaterally. The eyes look normal with good pupils. No jaundice is appreciated. Full range of ocular motion noted. Neck:      Musculoskeletal: No neck rigidity. Pulmonary:      Effort: Pulmonary effort is normal.   Musculoskeletal:         General: No swelling. Right lower leg: No edema. Left lower leg: No edema. Skin:     Coloration: Skin is not pale. Findings: No erythema. Neurological:      Mental Status: She is alert and oriented to person, place, and time. Gait: Gait normal.   Psychiatric:         Mood and Affect: Mood normal.         Behavior: Behavior normal.         Thought Content: Thought content normal.              We discussed the expected course, resolution and complications of the diagnosis(es) in detail. Medication risks, benefits, costs, interactions, and alternatives were discussed as indicated. I advised her to contact the office if her condition worsens, changes or fails to improve as anticipated. She expressed understanding with the diagnosis(es) and plan. Max Sosa is a 46 y.o. female being evaluated by a video visit encounter for concerns as above. A caregiver was present when appropriate. Due to this being a TeleHealth encounter (During [facility]- public health emergency), evaluation of the following organ systems was limited: Vitals/Constitutional/EENT/Resp/CV/GI//MS/Neuro/Skin/Heme-Lymph-Imm. Pursuant to the emergency declaration under the [facility]5 waiver authority and the [employer] [place] [employer] Act, this Virtual  Visit was conducted, with patient's (and/or legal guardian's) consent, to reduce the patient's risk of exposure to COVID-19 and provide necessary medical care. This note was done with the assistance of dragon speech software.   Some inadvertent errors or omissions may be present

## 2021-08-09 PROBLEM — Z30.2 ENCOUNTER FOR FEMALE STERILIZATION PROCEDURE: Status: ACTIVE | Noted: 2021-08-09

## 2022-03-19 PROBLEM — Z30.2 ENCOUNTER FOR FEMALE STERILIZATION PROCEDURE: Status: ACTIVE | Noted: 2021-08-09

## 2022-03-29 ENCOUNTER — TELEPHONE (OUTPATIENT)
Dept: FAMILY MEDICINE CLINIC | Age: 42
End: 2022-03-29

## 2022-03-29 NOTE — TELEPHONE ENCOUNTER
----- Message from Sindi Landaverde sent at 3/29/2022  9:23 AM EDT -----  Subject: Appointment Request    Reason for Call: Semi-Routine Skin Problem    QUESTIONS  Type of Appointment? Established Patient  Reason for appointment request? No appointments available during search  Additional Information for Provider? Patient would like to be set up on a   Thursday or a Friday to be seen for dry patches on her skin for about the   last 4 days. Please call and advise.   ---------------------------------------------------------------------------  --------------  CALL BACK INFO  What is the best way for the office to contact you? OK to leave message on   voicemail  Preferred Call Back Phone Number? [phone]  ---------------------------------------------------------------------------  --------------  SCRIPT ANSWERS  Relationship to Patient? Self  Are you having swelling in your throat or face? No  Are you having difficulty breathing? No  Have the symptoms worsened or spread in the last day? No  Are you having fevers (100.4), chills or sweats? No  Have you recently (14 days) seen a provider for this issue? No  Have you been diagnosed with, awaiting test results for, or told that you   are suspected of having COVID-19 (Coronavirus)? (If patient has tested   negative or was tested as a requirement for work, school, or travel and   not based on symptoms, answer no)? No  Within the past 10 days have you developed any of the following symptoms   (answer no if symptoms have been present longer than 10 days or began   more than 10 days ago)? Fever or Chills, Cough, Shortness of breath or   difficulty breathing, Loss of taste or smell, Sore throat, Nasal   congestion, Sneezing or runny nose, Fatigue or generalized body aches   (answer no if pain is specific to a body part e.g. back pain), Diarrhea,   Headache? No  Have you had close contact with someone with COVID-19 in the last 7 days?    No  (Service Expert  click yes below to proceed with [employer] As Usual   Scheduling)?  Yes

## 2022-04-12 ENCOUNTER — TRANSCRIBE ORDER (OUTPATIENT)
Dept: SCHEDULING | Age: 42
End: 2022-04-12

## 2022-04-12 DIAGNOSIS — Z12.31 VISIT FOR SCREENING MAMMOGRAM: Primary | ICD-10-CM

## 2022-04-15 ENCOUNTER — HOSPITAL ENCOUNTER (OUTPATIENT)
Dept: MAMMOGRAPHY | Age: 42
Discharge: HOME | End: 2022-04-15
Attending: OBSTETRICS & GYNECOLOGY
Payer: MEDICAID

## 2022-04-15 DIAGNOSIS — Z12.31 VISIT FOR SCREENING MAMMOGRAM: ICD-10-CM

## 2022-04-15 PROCEDURE — 77063 BREAST TOMOSYNTHESIS BI: CPT

## 2022-04-22 ENCOUNTER — OFFICE VISIT (OUTPATIENT)
Dept: FAMILY MEDICINE CLINIC | Age: 42
End: 2022-04-22
Payer: MEDICAID

## 2022-04-22 VITALS
RESPIRATION RATE: 16 BRPM | SYSTOLIC BLOOD PRESSURE: 131 MMHG | DIASTOLIC BLOOD PRESSURE: 87 MMHG | WEIGHT: 293 LBS | BODY MASS INDEX: 50.02 KG/M2 | HEIGHT: 64 IN | OXYGEN SATURATION: 99 % | HEART RATE: 89 BPM | TEMPERATURE: 98.2 F

## 2022-04-22 DIAGNOSIS — Z13.6 SCREENING FOR CARDIOVASCULAR CONDITION: ICD-10-CM

## 2022-04-22 DIAGNOSIS — Z11.59 ENCOUNTER FOR HEPATITIS C SCREENING TEST FOR LOW RISK PATIENT: ICD-10-CM

## 2022-04-22 DIAGNOSIS — F32.A ANXIETY AND DEPRESSION: ICD-10-CM

## 2022-04-22 DIAGNOSIS — R21 RASH AND NONSPECIFIC SKIN ERUPTION: ICD-10-CM

## 2022-04-22 DIAGNOSIS — D50.0 IRON DEFICIENCY ANEMIA DUE TO CHRONIC BLOOD LOSS: ICD-10-CM

## 2022-04-22 DIAGNOSIS — D50.0 IRON DEFICIENCY ANEMIA DUE TO CHRONIC BLOOD LOSS: Primary | ICD-10-CM

## 2022-04-22 DIAGNOSIS — F41.9 ANXIETY AND DEPRESSION: ICD-10-CM

## 2022-04-22 PROCEDURE — 99214 OFFICE O/P EST MOD 30 MIN: CPT | Performed by: STUDENT IN AN ORGANIZED HEALTH CARE EDUCATION/TRAINING PROGRAM

## 2022-04-22 RX ORDER — HYDROCORTISONE 25 MG/G
CREAM TOPICAL 2 TIMES DAILY
Qty: 30 G | Refills: 1 | Status: SHIPPED | OUTPATIENT
Start: 2022-04-22 | End: 2022-05-13

## 2022-04-22 NOTE — PROGRESS NOTES
Angela Somers is a 38 y.o. female presenting today for Rash and Depression  . Chief Complaint   Patient presents with    Rash    Depression       HPI:  Angela Somers presents to the office today for Rash. Patient has a PMHx of Obesity, allergies. Patient reports a rash that started 3 weeks ago - around her neck, face, breasts and the back. Initially started as dry scaly patches, became red and itchy. Any moisturizer she used would burn. She took OTC hydrocortisone cream which helped and benadryl. Patient has recently been under a lot of stress since the past few weeks. She has a lot of anxiety and over thinks. She had a panic attack over [place]. PHQ-9 is 9. Denies any feelings of self harm or suicial ideation. Anemia: Patient has a history of iron deficiency anemia. Last hemoglobin was 10.9 in 8/21. She has been taking a multivitamin with iron supplements daily. Review of Systems   Constitutional: Negative for chills, diaphoresis, fever, malaise/fatigue and weight loss. HENT: Negative for congestion, ear discharge, ear pain, hearing loss, nosebleeds, sinus pain, sore throat and tinnitus. Eyes: Negative for blurred vision, double vision and photophobia. Respiratory: Negative for cough, sputum production, shortness of breath, wheezing and stridor. Cardiovascular: Negative for chest pain, palpitations, orthopnea, claudication and leg swelling. Gastrointestinal: Negative for abdominal pain, constipation, diarrhea, heartburn, nausea and vomiting. Genitourinary: Negative for dysuria, flank pain, frequency, hematuria and urgency. Musculoskeletal: Negative for back pain, joint pain, myalgias and neck pain. Skin: Positive for itching and rash. Neurological: Negative for tingling, tremors, sensory change, speech change, focal weakness, seizures, weakness and headaches. Psychiatric/Behavioral: Positive for depression. Negative for suicidal ideas.  The patient is nervous/anxious. All other systems reviewed and are negative.       Allergies   Allergen Reactions    Peanut Itching and Swelling    Tree Nut Itching and Swelling       PHQ Screening   3 most recent PHQ Screens 2022   Little interest or pleasure in doing things Several days   Feeling down, depressed, irritable, or hopeless Several days   Total Score PHQ 2 2   Trouble falling or staying asleep, or sleeping too much More than half the days   Feeling tired or having little energy More than half the days   Poor appetite, weight loss, or overeating Several days   Feeling bad about yourself - or that you are a failure or have let yourself or your family down More than half the days   Trouble concentrating on things such as school, work, reading, or watching TV Not at all   Moving or speaking so slowly that other people could have noticed; or the opposite being so fidgety that others notice Not at all   Thoughts of being better off dead, or hurting yourself in some way Not at all   PHQ 9 Score 9   How difficult have these problems made it for you to do your work, take care of your home and get along with others Not difficult at all       History  Past Medical History:   Diagnosis Date    Allergic rhinitis     COVID-19 vaccine series completed     Eczema     GERD (gastroesophageal reflux disease)     Morbid obesity ([address]     Perennial allergic rhinitis with seasonal variation 2015    Vertigo        Past Surgical History:   Procedure Laterality Date    HX KNEE ARTHROSCOPY  2009    right knee    HX TONSILLECTOMY         Social History     Socioeconomic History    Marital status: SINGLE     Spouse name: Not on file    Number of children: Not on file    Years of education: Not on file    Highest education level: Not on file   Occupational History    Not on file   Tobacco Use    Smoking status: Former Smoker     Quit date: 10/9/2012     Years since quittin.5    Smokeless tobacco: Never Used Substance and Sexual Activity    Alcohol use: No     Alcohol/week: 1.7 standard drinks     Types: 2 Standard drinks or equivalent per week    Drug use: No    Sexual activity: Yes     Partners: Male     Birth control/protection: I.U.D. Comment: removed iud 04/01/2021   Other Topics Concern    Not on file   Social History Narrative    Not on file     Social Determinants of Health     Financial Resource Strain:     Difficulty of Paying Living Expenses: Not on file   Food Insecurity:     Worried About [address] [place] in the Last Year: Not on file    Juice of Food in the Last Year: Not on file   Transportation Needs:     Lack of Transportation (Medical): Not on file    Lack of Transportation (Non-Medical): Not on file   Physical Activity:     Days of Exercise per Week: Not on file    Minutes of Exercise per Session: Not on file   Stress:     Feeling of Stress : Not on file   Social Connections:     Frequency of Communication with Friends and Family: Not on file    Frequency of Social Gatherings with Friends and Family: Not on file    Attends Hinduism Services: Not on file    Active Member of [address] [place] [address] or Organizations: Not on file    Attends Club or Organization Meetings: Not on file    Marital Status: Not on file   Intimate Partner Violence:     Fear of Current or Ex-Partner: Not on file    Emotionally Abused: Not on file    Physically Abused: Not on file    Sexually Abused: Not on file   Housing Stability:     Unable to Pay for Housing in the Last Year: Not on file    Number of [place] in the Last Year: Not on file    Unstable Housing in the Last Year: Not on file       Current Outpatient Medications   Medication Sig Dispense Refill    hydrocortisone (HYTONE) 2.5 % topical cream Apply  to affected area two (2) times a day. use thin layer 30 g 1    aspirin delayed-release 325 mg tablet Take 325 mg by mouth.       ibuprofen (MOTRIN) 600 mg tablet Take 1 Tablet by mouth every six (6) hours as needed for Pain. Indications: pain 30 Tablet 0    diphenhydrAMINE (BenadryL) 25 mg capsule Take 25 mg by mouth every six (6) hours as needed.  etodolac (LODINE) 300 mg capsule Take 300 mg by mouth as needed. Vitals:    04/22/22 1100   BP: 131/87   Pulse: 89   Resp: 16   Temp: 98.2 °F (36.8 °C)   SpO2: 99%   Weight: 313 lb (142 kg)   Height: 5' 4\" (1.626 m)   PainSc:   0 - No pain       Physical Exam  Vitals and nursing note reviewed. Constitutional:       General: She is not in acute distress. Appearance: Normal appearance. She is obese. She is not ill-appearing, toxic-appearing or diaphoretic. HENT:      Head: Normocephalic and atraumatic. Eyes:      General: No scleral icterus. Extraocular Movements: Extraocular movements intact. Conjunctiva/sclera: Conjunctivae normal.      Pupils: Pupils are equal, round, and reactive to light. Cardiovascular:      Rate and Rhythm: Normal rate and regular rhythm. Pulses: Normal pulses. Heart sounds: No murmur heard. Pulmonary:      Effort: Pulmonary effort is normal. No respiratory distress. Breath sounds: Normal breath sounds. No wheezing or rales. Musculoskeletal:         General: Normal range of motion. Cervical back: Normal range of motion. Right lower leg: No edema. Left lower leg: No edema. Skin:     General: Skin is warm and dry. Coloration: Skin is not jaundiced or pale. Findings: Rash present. Comments: Maculopapular healing rash with darkened skin over neck, back. Neurological:      General: No focal deficit present. Mental Status: She is alert and oriented to person, place, and time. Mental status is at baseline. Cranial Nerves: No cranial nerve deficit. Motor: No weakness. Gait: Gait normal.   Psychiatric:         Mood and Affect: Mood normal.         Behavior: Behavior normal.         Thought Content:  Thought content normal. Judgment: Judgment normal.         No visits with results within 3 Month(s) from this visit. Latest known visit with results is:   Hospital Outpatient Visit on 08/02/2021   Component Date Value Ref Range Status    WBC 08/02/2021 7.0  4.0 - 11.0 1000/mm3 Final    RBC 08/02/2021 4.29  3.60 - 5.20 M/uL Final    HGB 08/02/2021 10.9* 13.0 - 17.2 gm/dl Final    HCT 08/02/2021 35.1* 37.0 - 50.0 % Final    MCV 08/02/2021 81.8  80.0 - 98.0 fL Final    MCH 08/02/2021 25.4  25.4 - 34.6 pg Final    MCHC 08/02/2021 31.1  30.0 - 36.0 gm/dl Final    PLATELET 13/36/5646 002  140 - 450 1000/mm3 Final    MPV 08/02/2021 11.1* 6.0 - 10.0 fL Final    RDW-SD 08/02/2021 51.3* 36.4 - 46.3   Final    NRBC 08/02/2021 0  0 - 0   Final    IMMATURE GRANULOCYTES 08/02/2021 0.3  0.0 - 3.0 % Final    Comment: IG - Immature granulocytes (promyelocytes + myelocytes + metamyelocytes), their presence  indicates a left shift. An IG > 3% may predict positive blood cultures with 98% specificity.  (P<0.04) and 92% Positive Predictive Value (Hafsa-Melanie)1. Increased immature granulocytes  assist with the detection of infection and/or inflammation and may be present at an early stage  and are more sensitive and specific than band counts.         NEUTROPHILS 08/02/2021 68.0* 34 - 64 % Final    LYMPHOCYTES 08/02/2021 22.1* 28 - 48 % Final    MONOCYTES 08/02/2021 8.9  1 - 13 % Final    EOSINOPHILS 08/02/2021 0.4  0 - 5 % Final    BASOPHILS 08/02/2021 0.3  0 - 3 % Final    Sodium 08/02/2021 138  136 - 145 mEq/L Final    Potassium 08/02/2021 4.0  3.5 - 5.1 mEq/L Final    Chloride 08/02/2021 106  98 - 107 mEq/L Final    CO2 08/02/2021 27  21 - 32 mEq/L Final    Glucose 08/02/2021 86  74 - 106 mg/dl Final    BUN 08/02/2021 7  7 - 25 mg/dl Final    Creatinine 08/02/2021 0.6  0.6 - 1.3 mg/dl Final    GFR est AA 08/02/2021 >60.0    Final    Comment: THE [facility] STATES THAT THE MDRD STUDY EQUATION SHOULD ONLY BE USED ON  INDIVIDUALS 18 OR OLDER. THE REPORT ALSO NOTES THAT THE MDRD STUDY EQUATION HAS NOT BEEN  VALIDATED FOR USE WITH THE ELDERLY (OVER 79YEARS OF AGE), PREGNANT WOMEN, PATIENTS WITH SERIOUS  COMORBID CONDITIONS, OR PERSONS WITH EXTREMES OF BODY SIZE, MUSCLE MASS, OR NUTRITIONAL STATUS. APPLICATION OF THE EQUATION TO THESE PATIENT GROUPS MAY LEAD TO ERRORS IN GFR ESTIMATION. GFR  ESTIMATING EQUATIONS HAVE POORER AGREEMENT WITH MEASURED GFR FOR ILL HOSPITALIZED PATIENTS AND  FOR PEOPLE WITH NEAR NORMAL KIDNEY FUNCTION THAN FOR SUBJECTS IN THE MDRD STUDY. VALIDATION  STUDIES ARE IN PROGRESS TO EVALUATE THE MDRD STUDY EQUATION FOR ADDITIONAL ETHNIC GROUPS, THE  ELDERLY, VARIOUS DISEASE CONDITIONS, AND PEOPLE WITH NORMAL KIDNEY FUNCTION. GFRA----REFERS TO   GFRO---REFERS TO OTHER RACES    REFERENCES AVAILABLE UPON REQUEST.        GFR est non-AA 08/02/2021 >60    Final    Calcium 08/02/2021 9.2  8.5 - 10.1 mg/dl Final    Anion gap 08/02/2021 5  5 - 15 mmol/L Final    HCG urine, QL 08/02/2021 NEGATIVE  NEGATIVE   Final    Ventricular Rate 08/02/2021 89  BPM Final    Atrial Rate 08/02/2021 89  BPM Final    P-R Interval 08/02/2021 168  ms Final    QRS Duration 08/02/2021 82  ms Final    Q-T Interval 08/02/2021 346  ms Final    QTC Calculation (Bezet) 08/02/2021 420  ms Final    Calculated P Axis 08/02/2021 60  degrees Final    Calculated R Axis 08/02/2021 26  degrees Final    Calculated T Axis 08/02/2021 40  degrees Final    Diagnosis 08/02/2021    Final                    Value:Normal sinus rhythm  Normal ECG  No previous ECGs available  Confirmed by Porsha Santa M.D., Meghan Gifford ([id number]) on 8/2/2021 2:03:35 PM      ABO/Rh(D) 08/02/2021 O Rh Positive    Final    Antibody screen 08/02/2021 NEG    Final       No results found for any visits on 04/22/22.     Patient Care Team:  Patient Care Team:  Jhonatan Blood MD as PCP - General (Internal Medicine)  Jhonatan Blood MD as PCP - [facility] Provider  Sanjay Stone MD (General Surgery)      Assessment / Plan:      ICD-10-CM ICD-9-CM    1. Iron deficiency anemia due to chronic blood loss  D50.0 280.0 IRON PROFILE      FERRITIN   2. Anxiety and depression  F41.9 300.00     F32. A 311    3. Rash and nonspecific skin eruption  R21 782.1 REFERRAL TO DERMATOLOGY      hydrocortisone (HYTONE) 2.5 % topical cream   4. Screening for cardiovascular condition  Z13.6 V81.2 LIPID PANEL   5. Encounter for hepatitis C screening test for low risk patient  Z11.59 V73.89 HEPATITIS C AB     Anemia: Check CBC, iron profile, ferritin. Mild depression: Advised patient to start seeing a therapist.  Provided with information to schedule an appointment. Will hold off on starting on medication at this point. Rash: Patient has multiple allergies. She also feels that the rash is associated more with stress. Will prescribe topical hydrocortisone. She wants to be referred to dermatology. Follow-up and Dispositions    · Return in about 8 weeks (around 6/17/2022) for 15 mins, Depression/Anxiety f/u. I asked the patient if she  had any questions and answered her  questions. The patient stated that she understands the treatment plan and agrees with the treatment plan    This document was created with a voice activated dictation system and may contain transcription errors. no chest pain and no edema.

## 2022-04-29 ENCOUNTER — HOSPITAL ENCOUNTER (OUTPATIENT)
Dept: LAB | Age: 42
Discharge: HOME | End: 2022-04-29

## 2022-04-29 LAB — SENTARA SPECIMEN COL,SENBCF: NORMAL

## 2022-04-29 PROCEDURE — 99001 SPECIMEN HANDLING PT-LAB: CPT

## 2022-05-01 LAB
CHOLEST SERPL-MCNC: 158 MG/DL (ref 110–200)
FE % SATURATION,PSAT: 7 % (ref 20–50)
FERRITIN SERPL-MCNC: 15 NG/ML (ref 10–291)
HCV AB SER IA-ACNC: NORMAL
HDLC SERPL-MCNC: 2.7 MG/DL (ref 0–5)
HDLC SERPL-MCNC: 58 MG/DL
IRON,IRN: 26 MCG/DL (ref 30–160)
LDL/HDL RATIO,LDHD: 1.3
LDLC SERPL CALC-MCNC: 77 MG/DL (ref 50–99)
NON-HDL CHOLESTEROL, 011976: 100 MG/DL
TIBC,TIBC: 350 MCG/DL (ref 228–428)
TRIGL SERPL-MCNC: 116 MG/DL (ref 40–149)
UIBC SERPL-MCNC: 324 MCG/DL (ref 110–370)
VLDLC SERPL CALC-MCNC: 23 MG/DL (ref 8–30)

## 2022-05-03 NOTE — PROGRESS NOTES
Two pt identifiers verified. Pt was given lab results states she understands and has no further questions or concerns.

## 2022-05-13 ENCOUNTER — HOSPITAL ENCOUNTER (OUTPATIENT)
Dept: LAB | Age: 42
Discharge: HOME | End: 2022-05-13

## 2022-05-13 ENCOUNTER — OFFICE VISIT (OUTPATIENT)
Dept: FAMILY MEDICINE CLINIC | Age: 42
End: 2022-05-13
Payer: MEDICAID

## 2022-05-13 VITALS
TEMPERATURE: 96.2 F | HEART RATE: 81 BPM | RESPIRATION RATE: 16 BRPM | WEIGHT: 293 LBS | SYSTOLIC BLOOD PRESSURE: 128 MMHG | DIASTOLIC BLOOD PRESSURE: 81 MMHG | OXYGEN SATURATION: 97 % | HEIGHT: 64 IN | BODY MASS INDEX: 50.02 KG/M2

## 2022-05-13 DIAGNOSIS — D50.0 IRON DEFICIENCY ANEMIA DUE TO CHRONIC BLOOD LOSS: Primary | ICD-10-CM

## 2022-05-13 DIAGNOSIS — R21 RASH AND NONSPECIFIC SKIN ERUPTION: ICD-10-CM

## 2022-05-13 DIAGNOSIS — D50.0 IRON DEFICIENCY ANEMIA DUE TO CHRONIC BLOOD LOSS: ICD-10-CM

## 2022-05-13 LAB — SENTARA SPECIMEN COL,SENBCF: NORMAL

## 2022-05-13 PROCEDURE — 99214 OFFICE O/P EST MOD 30 MIN: CPT | Performed by: STUDENT IN AN ORGANIZED HEALTH CARE EDUCATION/TRAINING PROGRAM

## 2022-05-13 PROCEDURE — 99001 SPECIMEN HANDLING PT-LAB: CPT

## 2022-05-13 RX ORDER — TRIAMCINOLONE ACETONIDE 0.25 MG/ML
LOTION TOPICAL 2 TIMES DAILY
Qty: 60 ML | Refills: 1 | Status: SHIPPED | OUTPATIENT
Start: 2022-05-13 | End: 2022-05-13

## 2022-05-13 RX ORDER — KETOCONAZOLE 20 MG/G
CREAM TOPICAL DAILY
Qty: 30 G | Refills: 1 | Status: SHIPPED | OUTPATIENT
Start: 2022-05-13 | End: 2022-08-23

## 2022-05-13 RX ORDER — HYDROXYZINE 50 MG/1
50 TABLET, FILM COATED ORAL
Qty: 30 TABLET | Refills: 1 | Status: SHIPPED | OUTPATIENT
Start: 2022-05-13 | End: 2022-06-13 | Stop reason: SDUPTHER

## 2022-05-13 NOTE — PROGRESS NOTES
Lori Tan is a 38 y.o. female presenting today for Sleep Problem (pt states \" that she only gets about 4 hours of sleepn)  . Chief Complaint   Patient presents with    Sleep Problem     pt states \" that she only gets about 4 hours of sleepn       HPI:  Lori Tan presents to the office today for sleep issues    Patient has a PMHx of Obesity, iron deficiency anemia. Last visit, patient was noted to have mild depression with PHQ-9 elevated at 9. She  described a lot of stress at the time. Today, she states that she is feeling much better. She no longer has anxiety or low moods. She feels much happier. Denies any feelings of self harm or suicidal ideation. Patient states it takes her a long time to fall asleep and she is getting 4 hours of sleep nightly. She wakes up intermittently because she feels hot and itchy. She has tried melatonin and benadryl with no significant relief. She states the itching is during the day but worse at night preventing her from getting any sleep. The rash is worse around her neck, axillae and under her breast.    Anemia: Patient has a history of iron deficiency anemia. Last hemoglobin was 10.9 in 8/21. She has been taking a multivitamin with iron supplements daily. Review of Systems   Constitutional: Negative for chills, diaphoresis, fever, malaise/fatigue and weight loss. HENT: Negative for congestion, ear discharge, ear pain, hearing loss, nosebleeds, sinus pain, sore throat and tinnitus. Eyes: Negative for blurred vision, double vision and photophobia. Respiratory: Negative for cough, sputum production, shortness of breath, wheezing and stridor. Cardiovascular: Negative for chest pain, palpitations, orthopnea, claudication and leg swelling. Gastrointestinal: Negative for abdominal pain, constipation, diarrhea, heartburn, nausea and vomiting. Genitourinary: Negative for dysuria, flank pain, frequency, hematuria and urgency. Musculoskeletal: Negative for back pain, joint pain, myalgias and neck pain. Skin: Positive for itching and rash. Neurological: Negative for tingling, tremors, sensory change, speech change, focal weakness, seizures, weakness and headaches. Psychiatric/Behavioral: Positive for depression. Negative for suicidal ideas. The patient is nervous/anxious. All other systems reviewed and are negative.       Allergies   Allergen Reactions    Peanut Itching and Swelling    Tree Nut Itching and Swelling       PHQ Screening   3 most recent PHQ Screens 5/13/2022   Little interest or pleasure in doing things Not at all   Feeling down, depressed, irritable, or hopeless Not at all   Total Score PHQ 2 0   Trouble falling or staying asleep, or sleeping too much -   Feeling tired or having little energy -   Poor appetite, weight loss, or overeating -   Feeling bad about yourself - or that you are a failure or have let yourself or your family down -   Trouble concentrating on things such as school, work, reading, or watching TV -   Moving or speaking so slowly that other people could have noticed; or the opposite being so fidgety that others notice -   Thoughts of being better off dead, or hurting yourself in some way -   PHQ 9 Score -   How difficult have these problems made it for you to do your work, take care of your home and get along with others -       History  Past Medical History:   Diagnosis Date    Allergic rhinitis     COVID-19 vaccine series completed     Eczema     GERD (gastroesophageal reflux disease)     Morbid obesity ([facility] [address]     Perennial allergic rhinitis with seasonal variation 5/22/2015    Vertigo        Past Surgical History:   Procedure Laterality Date    HX KNEE ARTHROSCOPY  2009    right knee    HX TONSILLECTOMY         Social History     Socioeconomic History    Marital status: SINGLE     Spouse name: Not on file    Number of children: Not on file    Years of education: Not on file    Highest education level: Not on file   Occupational History    Not on file   Tobacco Use    Smoking status: Former Smoker     Quit date: 10/9/2012     Years since quittin.5    Smokeless tobacco: Never Used   Substance and Sexual Activity    Alcohol use: No     Alcohol/week: 1.7 standard drinks     Types: 2 Standard drinks or equivalent per week    Drug use: No    Sexual activity: Yes     Partners: Male     Birth control/protection: I.U.D. Comment: removed iud 2021   Other Topics Concern    Not on file   Social History Narrative    Not on file     Social Determinants of Health     Financial Resource Strain:     Difficulty of Paying Living Expenses: Not on file   Food Insecurity:     Worried About [facility] in the Last Year: Not on file    Juice of Food in the Last Year: Not on file   Transportation Needs:     Lack of Transportation (Medical): Not on file    Lack of Transportation (Non-Medical):  Not on file   Physical Activity:     Days of Exercise per Week: Not on file    Minutes of Exercise per Session: Not on file   Stress:     Feeling of Stress : Not on file   Social Connections:     Frequency of Communication with Friends and Family: Not on file    Frequency of Social Gatherings with Friends and Family: Not on file    Attends Rastafarian Services: Not on file    Active Member of [address] [place] [address] or Organizations: Not on file    Attends Club or Organization Meetings: Not on file    Marital Status: Not on file   Intimate Partner Violence:     Fear of Current or Ex-Partner: Not on file    Emotionally Abused: Not on file    Physically Abused: Not on file    Sexually Abused: Not on file   Housing Stability:     Unable to Pay for Housing in the Last Year: Not on file    Number of [place] in the Last Year: Not on file    Unstable Housing in the Last Year: Not on file       Current Outpatient Medications   Medication Sig Dispense Refill    hydrOXYzine HCL (ATARAX) 50 mg tablet Take 1 Tablet by mouth three (3) times daily as needed for Itching. 30 Tablet 1    ketoconazole (NIZORAL) 2 % topical cream Apply  to affected area daily. 30 g 1    aspirin delayed-release 325 mg tablet Take 325 mg by mouth.  ibuprofen (MOTRIN) 600 mg tablet Take 1 Tablet by mouth every six (6) hours as needed for Pain. Indications: pain 30 Tablet 0    etodolac (LODINE) 300 mg capsule Take 300 mg by mouth as needed. (Patient not taking: Reported on 5/13/2022)           Vitals:    05/13/22 1017   BP: 128/81   Pulse: 81   Resp: 16   Temp: (!) 96.2 °F (35.7 °C)   TempSrc: Temporal   SpO2: 97%   Weight: 318 lb (144.2 kg)   Height: 5' 4\" (1.626 m)   PainSc:   0 - No pain       Physical Exam  Vitals and nursing note reviewed. Constitutional:       General: She is not in acute distress. Appearance: Normal appearance. She is obese. She is not ill-appearing, toxic-appearing or diaphoretic. HENT:      Head: Normocephalic and atraumatic. Eyes:      General: No scleral icterus. Extraocular Movements: Extraocular movements intact. Conjunctiva/sclera: Conjunctivae normal.      Pupils: Pupils are equal, round, and reactive to light. Cardiovascular:      Rate and Rhythm: Normal rate and regular rhythm. Pulses: Normal pulses. Heart sounds: No murmur heard. Pulmonary:      Effort: Pulmonary effort is normal. No respiratory distress. Breath sounds: Normal breath sounds. No wheezing or rales. Musculoskeletal:         General: Normal range of motion. Cervical back: Normal range of motion. Right lower leg: No edema. Left lower leg: No edema. Skin:     General: Skin is warm and dry. Coloration: Skin is not jaundiced or pale. Findings: Rash present. Comments: Macular healing rash with darkened skin behind neck, axillae and under breast   Neurological:      General: No focal deficit present.       Mental Status: She is alert and oriented to person, place, and time. Mental status is at baseline. Cranial Nerves: No cranial nerve deficit. Motor: No weakness. Gait: Gait normal.   Psychiatric:         Mood and Affect: Mood normal.         Behavior: Behavior normal.         Thought Content: Thought content normal.         Judgment: Judgment normal.         Hospital Outpatient Visit on 04/29/2022   Component Date Value Ref Range Status    [facility] SPECIM[facility] COL 04/29/2022 Specimens collected/sent to [facility]    Final   Orders Only on 04/22/2022   Component Date Value Ref Range Status    Iron 04/29/2022 26* 30 - 160 mcg/dL Final    UIBC 04/29/2022 324  110 - 370 mcg/dL Final    TIBC 04/29/2022 350  228 - 428 mcg/dL Final    Iron % saturation 04/29/2022 7* 20 - 50 % Final    Ferritin 04/29/2022 15  10 - 291 ng/mL Final    Triglyceride 04/29/2022 116  40 - 149 mg/dL Final    HDL Cholesterol 04/29/2022 58  >=40 mg/dL Final    Cholesterol, total 04/29/2022 158  110 - 200 mg/dL Final    CHOLESTEROL/HDL 04/29/2022 2.7  0.0 - 5.0 Final    Non-HDL Cholesterol 04/29/2022 100  <130 mg/dL Final    LDL, calculated 04/29/2022 77  50 - 99 mg/dL Final    VLDL, calculated 04/29/2022 23  8 - 30 mg/dL Final    LDL/HDL Ratio 04/29/2022 1.3   Final    Comment: Test includes cholesterol, HDL cholesterol, triglycerides and LDL. Cholesterol Recommended NCEP guidelines in mg/dL:    Less than 200            Desirable  200 - 239                Borderline High  Greater than or  = 240   High      Please Note:  Total Chol/HDL Ratio                     Men     Women  1/2 Avg. Risk    3.4     3.3      Avg. Risk    5.0     4.4  2X  Avg. Risk    9.6     7.1  3X  Avg. Risk   23.4    11.0            Hep C Virus Ab 04/29/2022 None Detected  None Detec Final       No results found for any visits on 05/13/22.     Patient Care Team:  Patient Care Team:  Gabriella Steele MD as PCP - General (Internal Medicine Physician)  Gabriella Steele MD as PCP - [address] [place] [address] Dr Krishnamurthy Provider  Ana Paula Bone MD (General Surgery)      Assessment / Plan:      ICD-10-CM ICD-9-CM    1. Iron deficiency anemia due to chronic blood loss  D50.0 280.0 CBC WITH AUTOMATED DIFF   2. Rash and nonspecific skin eruption  R21 782.1 hydrOXYzine HCL (ATARAX) 50 mg tablet      ketoconazole (NIZORAL) 2 % topical cream      DISCONTINUED: triamcinolone acetonide 0.025 % lotion       Labs reviewed and discussed with patient. Anemia: Iron panel reviewed - notable for iron deficiency. Continue iron supplements. Check CBC     Depression: Resolved. Reports feeling happy with no depressive symptoms at this time. Rash with itching: Patient finding it difficult to sleep due to the itching. Will prescribe hydroxyzine as needed. If no improvement within the next 2 weeks will refer to dermatology. Rash in flexural areas - appears to be intertrigo - will prescribe antifungal cream.         Follow-up and Dispositions    · Return in about 4 months (around 9/13/2022) for Routine F/u. I asked the patient if she  had any questions and answered her  questions. The patient stated that she understands the treatment plan and agrees with the treatment plan    This document was created with a voice activated dictation system and may contain transcription errors.

## 2022-05-13 NOTE — PROGRESS NOTES
Andre Prado is a 38 y.o. female that is here for a   Chief Complaint   Patient presents with    Sleep Problem         1. Have you been to the ER, urgent care clinic since your last visit? Hospialized since your last visit?no  2. Have you seen or consulted any other health care providers outside of the [address] [place] [address] since your last visit? Include any pap smears o colon screening.  Yes pap smear       Health Maintenance reviewed - yes      Upcoming Appts  no      VORB: No orders of the defined types were placed in this encounter.   Xavier Elizabeth MD/ Joyce Anguiano MA

## 2022-05-14 LAB
ABSOLUTE LYMPHOCYTE COUNT, 10803: 1.4 K/UL (ref 1–4.8)
BASOPHILS # BLD: 0 K/UL (ref 0–0.2)
BASOPHILS NFR BLD: 0 % (ref 0–2)
EOSINOPHIL # BLD: 0.2 K/UL (ref 0–0.5)
EOSINOPHIL NFR BLD: 3 % (ref 0–6)
ERYTHROCYTE [DISTWIDTH] IN BLOOD BY AUTOMATED COUNT: 19.5 % (ref 10–15.5)
GRANULOCYTES,GRANS: 70 % (ref 40–75)
HCT VFR BLD AUTO: 36.4 % (ref 35.1–46.5)
HGB BLD-MCNC: 10.7 G/DL (ref 11.7–15.5)
LYMPHOCYTES, LYMLT: 17 % (ref 20–45)
MCH RBC QN AUTO: 25 PG (ref 26–34)
MCHC RBC AUTO-ENTMCNC: 29 G/DL (ref 31–36)
MCV RBC AUTO: 85 FL (ref 80–99)
MONOCYTES # BLD: 0.8 K/UL (ref 0.1–1)
MONOCYTES NFR BLD: 10 % (ref 3–12)
NEUTROPHILS # BLD AUTO: 5.9 K/UL (ref 1.8–7.7)
PLATELET # BLD AUTO: 351 K/UL (ref 140–440)
PMV BLD AUTO: 11.5 FL (ref 9–13)
RBC # BLD AUTO: 4.29 M/UL (ref 3.8–5.2)
WBC # BLD AUTO: 8.4 K/UL (ref 4–11)

## 2022-06-06 ENCOUNTER — TELEPHONE (OUTPATIENT)
Dept: FAMILY MEDICINE CLINIC | Age: 42
End: 2022-06-06

## 2022-06-06 NOTE — TELEPHONE ENCOUNTER
Patient states the hydroxyzine is helping some, but the topical cream is not helping. She says she is still itching. She was given information for dermatology referral ad will follow up to schedule appointment. Please advise.

## 2022-06-13 DIAGNOSIS — R21 RASH AND NONSPECIFIC SKIN ERUPTION: ICD-10-CM

## 2022-06-14 RX ORDER — HYDROXYZINE 50 MG/1
50 TABLET, FILM COATED ORAL
Qty: 30 TABLET | Refills: 1 | Status: SHIPPED | OUTPATIENT
Start: 2022-06-14 | End: 2022-09-23 | Stop reason: SDUPTHER

## 2022-08-23 ENCOUNTER — OFFICE VISIT (OUTPATIENT)
Dept: FAMILY MEDICINE CLINIC | Age: 42
End: 2022-08-23
Payer: MEDICAID

## 2022-08-23 VITALS
DIASTOLIC BLOOD PRESSURE: 88 MMHG | BODY MASS INDEX: 50.02 KG/M2 | SYSTOLIC BLOOD PRESSURE: 122 MMHG | HEART RATE: 80 BPM | RESPIRATION RATE: 16 BRPM | WEIGHT: 293 LBS | TEMPERATURE: 98.1 F | HEIGHT: 64 IN | OXYGEN SATURATION: 99 %

## 2022-08-23 DIAGNOSIS — F41.9 ANXIETY AND DEPRESSION: ICD-10-CM

## 2022-08-23 DIAGNOSIS — R21 RASH AND NONSPECIFIC SKIN ERUPTION: ICD-10-CM

## 2022-08-23 DIAGNOSIS — G89.29 CHRONIC PAIN OF RIGHT KNEE: Primary | ICD-10-CM

## 2022-08-23 DIAGNOSIS — M25.561 CHRONIC PAIN OF RIGHT KNEE: Primary | ICD-10-CM

## 2022-08-23 DIAGNOSIS — F32.A ANXIETY AND DEPRESSION: ICD-10-CM

## 2022-08-23 DIAGNOSIS — D50.0 IRON DEFICIENCY ANEMIA DUE TO CHRONIC BLOOD LOSS: ICD-10-CM

## 2022-08-23 PROCEDURE — 99214 OFFICE O/P EST MOD 30 MIN: CPT | Performed by: STUDENT IN AN ORGANIZED HEALTH CARE EDUCATION/TRAINING PROGRAM

## 2022-08-23 RX ORDER — MELOXICAM 15 MG/1
15 TABLET ORAL DAILY
Qty: 30 TABLET | Refills: 2 | Status: SHIPPED | OUTPATIENT
Start: 2022-08-23

## 2022-08-23 RX ORDER — LEVOCETIRIZINE DIHYDROCHLORIDE 5 MG/1
5 TABLET, FILM COATED ORAL DAILY
COMMUNITY

## 2022-08-23 NOTE — LETTER
NOTIFICATION RETURN TO WORK     8/23/2022 7:55 AM    Ms. James Marcus  [address] [place] [id number]      To Whom It May Concern:    James Marcus is currently under the care of [address]. Please excuse patient from work today. She may return to work on 8/24/22. If there are questions or concerns please have the patient contact our office.         Sincerely,      Guilherme Heredia MD

## 2022-08-23 NOTE — PROGRESS NOTES
Cristo Narayan is a 38 y.o. female presenting today for Knee Pain  . Chief Complaint   Patient presents with    Knee Pain       HPI:  Cristo Narayan presents to the office today for knee pain. Patient has a PMHx of Obesity, iron deficiency anemia. Today, patient is complaining of right knee pain. She has knee pain intermittently but has recently worsened. It is worse while walking. This is in the same knee that she had injured in the past.  She has been seeing a chiropractor with no improvement. Itchy skin: Patient is now following with dermatology. States that the rash and itching has improved. Currently using clobetasol and Xyzal.    Anemia: Patient has a history of iron deficiency anemia. Currently taking iron supplements daily. Recent hemoglobin in 5/22 was 10.7. Review of Systems   Constitutional:  Negative for chills, diaphoresis, fever, malaise/fatigue and weight loss. HENT:  Negative for congestion, ear discharge, ear pain, hearing loss, nosebleeds, sinus pain, sore throat and tinnitus. Eyes:  Negative for blurred vision, double vision and photophobia. Respiratory:  Negative for cough, sputum production, shortness of breath, wheezing and stridor. Cardiovascular:  Negative for chest pain, palpitations, orthopnea, claudication and leg swelling. Gastrointestinal:  Negative for abdominal pain, constipation, diarrhea, heartburn, nausea and vomiting. Genitourinary:  Negative for dysuria, flank pain, frequency, hematuria and urgency. Musculoskeletal:  Positive for joint pain. Negative for back pain, myalgias and neck pain. Skin:  Positive for itching and rash. Neurological:  Negative for tingling, tremors, sensory change, speech change, focal weakness, seizures, weakness and headaches. Psychiatric/Behavioral:  Positive for depression. Negative for suicidal ideas. The patient is nervous/anxious. All other systems reviewed and are negative.     Allergies Allergen Reactions    Peanut Itching and Swelling    Tree Nut Itching and Swelling       PHQ Screening   3 most recent PHQ Screens 2022   Little interest or pleasure in doing things Not at all   Feeling down, depressed, irritable, or hopeless Not at all   Total Score PHQ 2 0   Trouble falling or staying asleep, or sleeping too much -   Feeling tired or having little energy -   Poor appetite, weight loss, or overeating -   Feeling bad about yourself - or that you are a failure or have let yourself or your family down -   Trouble concentrating on things such as school, work, reading, or watching TV -   Moving or speaking so slowly that other people could have noticed; or the opposite being so fidgety that others notice -   Thoughts of being better off dead, or hurting yourself in some way -   PHQ 9 Score -   How difficult have these problems made it for you to do your work, take care of your home and get along with others -       History  Past Medical History:   Diagnosis Date    Allergic rhinitis     COVID-19 vaccine series completed     Eczema     GERD (gastroesophageal reflux disease)     Morbid obesity ([facility] [address]     Perennial allergic rhinitis with seasonal variation 2015    Vertigo        Past Surgical History:   Procedure Laterality Date    HX KNEE ARTHROSCOPY  2009    right knee    HX TONSILLECTOMY         Social History     Socioeconomic History    Marital status: SINGLE     Spouse name: Not on file    Number of children: Not on file    Years of education: Not on file    Highest education level: Not on file   Occupational History    Not on file   Tobacco Use    Smoking status: Former     Types: Cigarettes     Quit date: 10/9/2012     Years since quittin.8    Smokeless tobacco: Never   Substance and Sexual Activity    Alcohol use: No     Alcohol/week: 1.7 standard drinks     Types: 2 Standard drinks or equivalent per week    Drug use: No    Sexual activity: Yes     Partners: Male     Birth control/protection: I.U.D. Comment: removed iud 04/01/2021   Other Topics Concern    Not on file   Social History Narrative    Not on file     Social Determinants of Health     Financial Resource Strain: Not on file   Food Insecurity: Not on file   Transportation Needs: Not on file   Physical Activity: Not on file   Stress: Not on file   Social Connections: Not on file   Intimate Partner Violence: Not on file   Housing Stability: Not on file       Current Outpatient Medications   Medication Sig Dispense Refill    levocetirizine (Xyzal) 5 mg tablet Take 5 mg by mouth daily. meloxicam (MOBIC) 15 mg tablet Take 1 Tablet by mouth daily. 30 Tablet 2    hydrOXYzine HCL (ATARAX) 50 mg tablet Take 1 Tablet by mouth three (3) times daily as needed for Itching. (Patient not taking: Reported on 8/23/2022) 30 Tablet 1    ketoconazole (NIZORAL) 2 % topical cream Apply  to affected area daily. 30 g 1    etodolac (LODINE) 300 mg capsule Take 300 mg by mouth as needed. (Patient not taking: Reported on 5/13/2022)           Vitals:    08/23/22 0737 08/23/22 0800   BP: (!) 141/86 122/88   Pulse: 80    Resp: 16    Temp: 98.1 °F (36.7 °C)    TempSrc: Temporal    SpO2: 99%    Weight: 318 lb (144.2 kg)    Height: 5' 4\" (1.626 m)    PainSc:   5        Physical Exam  Vitals and nursing note reviewed. Constitutional:       General: She is not in acute distress. Appearance: Normal appearance. She is obese. She is not ill-appearing, toxic-appearing or diaphoretic. HENT:      Head: Normocephalic and atraumatic. Eyes:      General: No scleral icterus. Extraocular Movements: Extraocular movements intact. Conjunctiva/sclera: Conjunctivae normal.      Pupils: Pupils are equal, round, and reactive to light. Cardiovascular:      Rate and Rhythm: Normal rate and regular rhythm. Pulses: Normal pulses. Heart sounds: No murmur heard. Pulmonary:      Effort: Pulmonary effort is normal. No respiratory distress. Breath sounds: Normal breath sounds. No wheezing or rales. Musculoskeletal:      Cervical back: Normal range of motion. Right lower leg: No edema. Left lower leg: No edema. Skin:     General: Skin is warm and dry. Coloration: Skin is not jaundiced or pale. Neurological:      General: No focal deficit present. Mental Status: She is alert and oriented to person, place, and time. Mental status is at baseline. Cranial Nerves: No cranial nerve deficit. Motor: No weakness. Gait: Gait normal.   Psychiatric:         Mood and Affect: Mood normal.         Behavior: Behavior normal.         Thought Content: Thought content normal.         Judgment: Judgment normal.       No visits with results within 3 Month(s) from this visit. Latest known visit with results is:   Orders Only on 05/13/2022   Component Date Value Ref Range Status    WBC 05/13/2022 8.4  4.0 - 11.0 K/uL Final    RBC 05/13/2022 4.29  3.80 - 5.20 M/uL Final    HGB 05/13/2022 10.7 (A) 11.7 - 15.5 g/dL Final    HCT 05/13/2022 36.4  35.1 - 46.5 % Final    MCV 05/13/2022 85  80 - 99 fL Final    MCH 05/13/2022 25 (A) 26 - 34 pg Final    MCHC 05/13/2022 29 (A) 31 - 36 g/dL Final    RDW 05/13/2022 19.5 (A) 10.0 - 15.5 % Final    PLATELET 59/21/7932 835  140 - 440 K/uL Final    MPV 05/13/2022 11.5  9.0 - 13.0 fL Final    NEUTROPHILS 05/13/2022 70  40 - 75 % Final    Lymphocytes 05/13/2022 17 (A) 20 - 45 % Final    MONOCYTES 05/13/2022 10  3 - 12 % Final    EOSINOPHILS 05/13/2022 3  0 - 6 % Final    BASOPHILS 05/13/2022 0  0 - 2 % Final    ABS. NEUTROPHILS 05/13/2022 5.9  1.8 - 7.7 K/uL Final    ABSOLUTE LYMPHOCYTE COUNT 05/13/2022 1.4  1.0 - 4.8 K/uL Final    ABS. MONOCYTES 05/13/2022 0.8  0.1 - 1.0 K/uL Final    ABS. EOSINOPHILS 05/13/2022 0.2  0.0 - 0.5 K/uL Final    ABS. BASOPHILS 05/13/2022 0.0  0.0 - 0.2 K/uL Final       No results found for any visits on 08/23/22.     Patient Care Team:  Patient Care Team:  Yannick Loaiza, MD as PCP - General (Internal Medicine Physician)  Yannick Loaiza MD as PCP - REHABILITATION [facility] Empaneled Provider  Severiano Lipschutz, MD (General Surgery)      Assessment / Plan:      ICD-10-CM ICD-9-CM    1. Chronic pain of right knee  M25.561 719.46 REFERRAL TO ORTHOPEDICS    G89.29 338.29 meloxicam (MOBIC) 15 mg tablet      2. Iron deficiency anemia due to chronic blood loss  D50.0 280.0       3. Rash and nonspecific skin eruption  R21 782.1       4. Anxiety and depression  F41.9 300.00     F32. A 311           Chronic right knee pain: Mobic prescribed. Refer to orthopedics. Anemia: Continue iron supplements. CBC at next visit. Depression: No significant depressive symptoms at this time. Continue to monitor. Rash with itching: Now following with dermatology. Continue Xyzal.               I asked the patient if she  had any questions and answered her  questions. The patient stated that she understands the treatment plan and agrees with the treatment plan    This document was created with a voice activated dictation system and may contain transcription errors.

## 2022-09-02 ENCOUNTER — OFFICE VISIT (OUTPATIENT)
Dept: ORTHOPEDIC SURGERY | Age: 42
End: 2022-09-02
Payer: MEDICAID

## 2022-09-02 VITALS — WEIGHT: 293 LBS | HEIGHT: 64 IN | BODY MASS INDEX: 50.02 KG/M2

## 2022-09-02 DIAGNOSIS — E66.01 MORBID OBESITY (HCC): ICD-10-CM

## 2022-09-02 DIAGNOSIS — M25.571 ACUTE RIGHT ANKLE PAIN: ICD-10-CM

## 2022-09-02 DIAGNOSIS — M77.11 LATERAL EPICONDYLITIS OF BOTH ELBOWS: ICD-10-CM

## 2022-09-02 DIAGNOSIS — M77.12 LATERAL EPICONDYLITIS OF BOTH ELBOWS: ICD-10-CM

## 2022-09-02 DIAGNOSIS — M25.531 RIGHT WRIST PAIN: ICD-10-CM

## 2022-09-02 DIAGNOSIS — M25.561 CHRONIC PAIN OF RIGHT KNEE: ICD-10-CM

## 2022-09-02 DIAGNOSIS — M17.11 PRIMARY OSTEOARTHRITIS OF RIGHT KNEE: Primary | ICD-10-CM

## 2022-09-02 DIAGNOSIS — G89.29 CHRONIC PAIN OF RIGHT KNEE: ICD-10-CM

## 2022-09-02 PROCEDURE — 99203 OFFICE O/P NEW LOW 30 MIN: CPT | Performed by: SPECIALIST

## 2022-09-02 PROCEDURE — 73562 X-RAY EXAM OF KNEE 3: CPT | Performed by: SPECIALIST

## 2022-09-02 NOTE — PROGRESS NOTES
Patient: Christin Frankel                MRN: [id number]       SSN: [id number]  YOB: 1980        AGE: 38 y.o. SEX: female    PCP: Gabe Yañez MD  09/02/22    CC: RIGHT KNEE, RIGHT ANKLE, RIGHT WRIST, AND BILATERAL ELBOW PAIN    HISTORY:  Christin Frnakel is a 38 y.o. female referred by Dr. Lexii Ching for right knee, right ankle, bilateral elbow and right wrist pain. She has been experiencing right knee pain for the past couple of months. She fell while working as a [employer] for [employer] in 2009. She states that she fractured her right ankle and dislocated her right kneecap. She eventually underwent arthroscopic partial medial menisectomy a couple of months after her injury. Her ankle was treated non surgically in a short leg cast.  She still feels pain with standing, walking and stair climbing. She experiences startup pain after sitting. She is s/p right wrist arthroscopy in 2015. 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 She lives in [place] with her mom. Ms. Joey Chowdhury weighs 318 lbs and is 5'4\" tall. She gained about 50 lbs over the pandemic. She is right handed. She was considering bariatric surgery but the office she contacted did not accept her insurance. She does not like injections.     Lab Results   Component Value Date/Time    Hemoglobin A1c 5.7 04/08/2014 10:30 AM     Weight Metrics 9/2/2022 8/23/2022 5/13/2022 4/22/2022 8/10/2021 8/2/2021 12/5/2018   Weight 318 lb 318 lb 318 lb 313 lb 324 lb 8.3 oz 337 lb 294 lb   BMI 54.58 kg/m2 54.58 kg/m2 54.58 kg/m2 53.73 kg/m2 55.7 kg/m2 57.85 kg/m2 50.46 kg/m2       Patient Active Problem List   Diagnosis Code    Morbid obesity with BMI of 50.0-59.9, adult ([facility] E66.01, Z68.43    Encounter for female sterilization procedure Z30.2    Iron deficiency anemia due to chronic blood loss D50.0     REVIEW OF SYSTEMS:    Constitutional Symptoms: Negative   Eyes: Negative   Ears, Nose, Throat and Mouth: Negative   Cardiovascular: Negative   Respiratory: Negative   Genitourinary: Per HPI   Gastrointestinal: Per HPI   Integumentary (Skin and/or Breast): Negative   Musculoskeletal: Per HPI   Endocrine/Rheumatologic: Negative   Neurological: Per HPI   Hematology/Lymphatic: Negative    Allergic/Immunologic: Negative   Phychiatric: Negative    Social History     Socioeconomic History    Marital status: SINGLE     Spouse name: Not on file    Number of children: Not on file    Years of education: Not on file    Highest education level: Not on file   Occupational History    Not on file   Tobacco Use    Smoking status: Former     Types: Cigarettes     Quit date: 10/9/2012     Years since quittin.9    Smokeless tobacco: Never   Substance and Sexual Activity    Alcohol use: No     Alcohol/week: 1.7 standard drinks     Types: 2 Standard drinks or equivalent per week    Drug use: No    Sexual activity: Yes     Partners: Male     Birth control/protection: I.U.D. Comment: removed iud 2021   Other Topics Concern    Not on file   Social History Narrative    Not on file     Social Determinants of Health     Financial Resource Strain: Not on file   Food Insecurity: Not on file   Transportation Needs: Not on file   Physical Activity: Sufficiently Active    Days of Exercise per Week: 4 days    Minutes of Exercise per Session: 50 min   Stress: Not on file   Social Connections: Not on file   Intimate Partner Violence: Not At Risk    Fear of Current or Ex-Partner: No    Emotionally Abused: No    Physically Abused: No    Sexually Abused: No   Housing Stability: Not on file      Allergies   Allergen Reactions    Peanut Itching and Swelling    Tree Nut Itching and Swelling      Current Outpatient Medications   Medication Sig    levocetirizine (XYZAL) 5 mg tablet Take 5 mg by mouth daily. meloxicam (MOBIC) 15 mg tablet Take 1 Tablet by mouth daily. hydrOXYzine HCL (ATARAX) 50 mg tablet Take 1 Tablet by mouth three (3) times daily as needed for Itching. (Patient not taking: No sig reported)     No current facility-administered medications for this visit. PHYSICAL EXAMINATION:  Visit Vitals  Ht 5' 4\" (1.626 m)   Wt 318 lb (144.2 kg)   LMP 08/27/2022   BMI 54.58 kg/m²      ORTHO EXAMINATION:  Examination Right knee Left knee   Skin Intact Intact   Range of motion 95-0 120-0   Effusion - -   Medial joint line tenderness + +   Lateral joint line tenderness + -   Popliteal tenderness - -   Osteophytes palpable - -   Ugos - -   Patella crepitus + -   Anterior drawer - -   Lateral laxity - -   Medial laxity - -   Varus deformity - -   Valgus deformity - -   Pretibial edema - -   Calf tenderness - -   Well healed arthroscope incisions     Examination Right Ankle/Foot Left Ankle/Foot   Skin Intact Intact   Swelling - -   Dorsiflexion 30 10   Plantarflexion 40 25   Deformity - -   Inversion laxity - -   Anterior drawer - -   Medial tenderness - -   Lateral tenderness - -   Heel cord Intact Intact   Sensation Intact Intact   Bunion - -   Toe nails Normal Normal   Capillary refill Normal Normal     Examination Left Elbow Right Elbow   Skin Intact Intact   Range of Motion 135-0 135-0   Tenderness Lateral epicondyle Lateral epicondyle   Swelling - -   Bruising - -   Stability Normal Normal   Motor Strength  Normal Normal   Neurovascular Intact Intact     RADIOGRAPHS:  XR RIGHT KNEE 9/2/22 JESSI  IMPRESSION:  Three views with bilateral knees on AP view - No fractures,  effusion, mild  joint space narrowing,  osteophytes present. Kellgren Arturo grade 1. Mild squaring of the condyles. IMPRESSION:     ICD-10-CM ICD-9-CM    1. Primary osteoarthritis of right knee  M17.11 715.16 REFERRAL TO PHYSICAL THERAPY      AMB POC X-RAY KNEE 3 VIEW      2.  Chronic pain of right knee  M25.561 719.46 REFERRAL TO PHYSICAL THERAPY    G89.29 338.29 AMB POC X-RAY KNEE 3 VIEW      3. Lateral epicondylitis of both elbows  M77.11 726.32 REFERRAL TO PHYSICAL THERAPY    M77.12        4. Morbid obesity ([address]  E66.01 278.01 REFERRAL TO PHYSICAL THERAPY      5. Right wrist pain  M25.531 719.43 REFERRAL TO PHYSICAL THERAPY          PLAN:  She will start a brief course of outpatient physical therapy. There is no need for surgery or injection at this time. She will follow up as needed.       Scribed by Yee Hidalgo (Isabel Spence) as dictated by Tricia Dubon MD

## 2022-09-22 ENCOUNTER — TELEPHONE (OUTPATIENT)
Dept: ORTHOPEDIC SURGERY | Age: 42
End: 2022-09-22

## 2022-09-23 DIAGNOSIS — R21 RASH AND NONSPECIFIC SKIN ERUPTION: ICD-10-CM

## 2022-09-23 RX ORDER — HYDROXYZINE 50 MG/1
50 TABLET, FILM COATED ORAL
Qty: 30 TABLET | Refills: 1 | Status: SHIPPED | OUTPATIENT
Start: 2022-09-23

## 2022-09-23 NOTE — TELEPHONE ENCOUNTER
Requested Prescriptions     Pending Prescriptions Disp Refills    hydrOXYzine HCL (ATARAX) 50 mg tablet 30 Tablet 1     Sig: Take 1 Tablet by mouth three (3) times daily as needed for Itching.

## 2022-09-30 ENCOUNTER — APPOINTMENT (OUTPATIENT)
Dept: PHYSICAL THERAPY | Age: 42
End: 2022-09-30
Attending: SPECIALIST

## 2022-10-07 ENCOUNTER — HOSPITAL ENCOUNTER (OUTPATIENT)
Dept: PHYSICAL THERAPY | Age: 42
Discharge: HOME | End: 2022-10-07
Attending: SPECIALIST
Payer: MEDICAID

## 2022-10-07 PROCEDURE — 97165 OT EVAL LOW COMPLEX 30 MIN: CPT

## 2022-10-07 PROCEDURE — 97162 PT EVAL MOD COMPLEX 30 MIN: CPT

## 2022-10-07 NOTE — PROGRESS NOTES
Hand Therapy Evaluation and Daily Note    Patient Name: Millicent Maravilla  Date:10/7/2022  : 1980  Age: 38 y.o.y/o  [x]  Patient  Verified  Payor: Erik Kanner / Plan: Allen Ruvalcaba / Product Type: Managed Care Medicaid /    Referring Provider: MD Yolanda Beaver MD Visit:  as needed  Onset Date:    Surgical Date: na  Surgical Procedure: na    In time:10:45 AM  Out time:11:29 AM  Total Treatment Time (min): 44  Total Timed Codes (min): 24  1:1 Treatment Time ([facility] only): 44   Visit #: 1 of 8    Treatment Area: Right wrist pain [M25.531]  Right elbow pain [M25.521]  Left elbow pain [M25.522]    Precautions:    Hand Dominance: left handed   Hand Involved: bilateral    Total Evaluation Time:  20    History of Present Condition:  Patient is a left hand dominant 38 y.o. 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      Pain Rating:   Current: (0-no pain 10-debilitating pain) mild   At best: (0-no pain 10-debilitating pain) mild  At worst: (0-no pain 10-debilitating pain) moderate 6/10  Location: bilateral elbow sarah wrists  Type:  mild   Better with: rest, meloxicam  Worse with: use    Medications/Allergies/Past Medical History:  See chart; reviewed with patient. s/p right wrist arthroscopy in     Diagnostic Tests: none indicated    Prior Level of Function: (I) with ADL/IADL tasks with functional limitations and pain using sarah UEs.      Current Level of Function:  (I) with pain and functional limitations    Social History: Pt lives with mother in home    Occupation/Job Requirements: Massage therapist    Observation: sarah UE AROM WFL  Scar/incision:   na  Location:  sarah     Palpation:  tenderness with palpation to sarah lateral epicondyles     Range of Motion:    Elbow/Wrist   Wrist 10/7/2022  Right/Left    Flex 70/68    Ext 58/61    UD 48/40    RD 24/25    FA      Pro 87/89    Sup 60/71    Elbow     Flex 150/150    Ext 0/0        Strength:   Measurements: Taken with Fadi Dynamometer, in Lbs   Level 2 10/7/2022  Date   Right - elbow bent, FA neutral 56    Right - elbow ext, FA pronated 55    Left - elbow bent, FA neutral 53    Left - elbow ext, FA pronated 60    Deficit     Change            Sensation:    intact      Edema: GIRTH CHART measured in cm  Date: 10/7/2022     Side Right/Left    DPC circum. 19.9/20.4    Wrist Crease 16.7/16.8    FA      Elbow 29.0/29.8          Special Tests:     ADLs  Feeding:        []MaxA   []ModA   []Calixto   [] CGA   []SBA   []Kim   [x]Independent  UE Dressing:       []MaxA   []ModA   []Calixto   [] CGA   []SBA   []Kim   [x]Independent  LE Dressing:       []MaxA   []ModA   []Calixto   [] CGA   []SBA   []Kim   [x]Independent  Grooming:       []MaxA   []ModA   []Calixto   [] CGA   []SBA   []Kim   [x]Independent  Toileting:       []MaxA   []ModA   []Calixto   [] CGA   []SBA   []Kim   [x]Independent  Bathing:       []MaxA   []ModA   []Calixto   [] CGA   []SBA   []Kim   [x]Independent  Light Meal Prep:    []MaxA   []ModA   []Calixto   [] CGA   []SBA   []Kim   [x]Independent  Household/Other: []MaxA   []ModA   []Calixto   [] CGA   []SBA   []Kim   [x]Independent  Adaptive Equip:     []MaxA   []ModA   []Calixto   [] CGA   []SBA   []Kim   []Independent  Driving:       []MaxA   []ModA   []Calixto   [] CGA   []SBA   []Kim   [x]Independent      Todays Treatment:  Patient received an initial evaluation today followed by education as to diagnosis, precautions and treatment plan. Pt educated on activity modifications and wrist brace wear to reduce symptoms bilaterally. OBJECTIVE  24 min Self Care/Home Management: prognosis, diagnosis, activity modifications, treatment plan, wrist brace wear   Rationale:  education   to improve the patients ability to reduce pain with functional use of sarah UEs.      With   [] TE [] TA   [] neuro   [] other: Patient Education: [x] Review HEP    [] Progressed/Changed HEP based on:   [] positioning   [] body mechanics   [] transfers   [] heat/ice application   [] Splint wear/care   [] Sensory re-education   [] scar management      [] other:      Pain Level (0-10 scale) post treatment: 0/10 left, 1/10 right    Patient will continue to benefit from skilled OT services to modify and progress therapeutic interventions, address ROM deficits, address strength deficits, analyze and address soft tissue restrictions, and instruct in home and community integration to attain goals. Assessment: Pt presents to skilled OT today rating her pain level 1/10 in sarah UEs that is worse with use and better with rest.  There is tenderness with palpation to sarah lateral epicondyles. Her sarah wrist and elbow AROM is WFL. There is limitation with sarah FA supination and discomfort with AROM. Her sarah hand  strength is good with pain in sarah wrists and lateral elbows. There is edema noted in the left elbow crease. Evaluation Complexity: History LOW Complexity : Brief history review  Examination LOW Complexity : 1-3 performance deficits relating to physical, cognitive , or psychosocial skils that result in activity limitations and / or participation restrictions  Clinical Decision Making MEDIUM Complexity : Patient may present with comorbidities that affect occupational performnce.  Miniml to moderate modification of tasks or assistance (eg, physical or verbal ) with assesment(s) is necessary to enable patient to complete evaluation   Overall Complexity Rating: LOW     Patient would benefit from OT/Hand therapy services for the following problems:  Problem List: Pain effecting function, Decreased range of motion, Decreased strength, Edema effecting function, Decreased coordination/prehension, Decreased ADL/functional abilities , Decreased activity tolerance, and Decreased flexibility/joint mobility     Treatment Plan may include any combination of the following: Therapeutic exercise, Therapeutic activities, Neuromuscular re-education, Physical agent/modality, Manual therapy, Patient education, and ADLs/IADLs  Patient / Family readiness to learn indicated by: asking questions, trying to perform skills, and interest  Persons(s) to be included in education:   patient (P)  Barriers to Learning/Limitations: None  Patient Goal (s): pain free  Patient Self Reported Health Status: good  Rehabilitation Potential: good  Short Term Goals: To be accomplished in 2  weeks:  Goal:* Patient will be compliant with initial home exercise program to take an active role in their rehabilitation process. Status at Eval: not yet provided wrist AROM HEP    Goal:* Patient will demonstrate a good understanding of their condition and strategies for self-management. Status at Eval: pt educated on prognosis, diagnosis, activity modifications, treatment plan, wrist brace wear    Long Term Goals: To be accomplished in 4 weeks:   Goal:* Pt will report reduced pain, 3-4/10, with work related tasks using sarah UEs in order to perform massage therapy with less discomfort. Status at eval: 6/10 pain with use    Goal:* Pt will be (I) with strategies for self mgmt of symptoms in order to demonstrate a good understanding of her condition. Status at eval: Pt educated on prognosis, diagnosis, activity modifications, treatment plan, wrist brace wear     Goal:* Patient will have 70 degrees of right forearm supination in order to perform ADL's including washing face and accepting change. Status at Eval: 60 deg     Goal:* Patient will have 75 degrees of left forearm supination in order to perform ADL's including washing face and accepting change. Status at Eval: 71 deg    Goal:* Pt will improve FOTO outcome measure score by 5 points in order to demonstrate improved functional performance using sarah UEs.   Status at eval: 69    Frequency / Duration: Patient to be seen 2 times per week for 4 weeks:  Patient/ Caregiver education and instruction: Diagnosis, prognosis, self care, activity modification, and exercises      Tracy Sandoval OT, 10/7/2022 10:44 AM

## 2022-10-07 NOTE — PROGRESS NOTES
[facility]  [address]  [place], [address]  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 Assessment/ key information: 38y.o. year old female presents with CC of right knee pain that began in 2022. PMHx includes right menisectomy in . Impairments noted today: decreased right patellar mobility; increased mm restrictions in right VL and ITB; decreased right hip abd and ext strength; decreased right hamstring strength. Patient will benefit from physical therapy to address deficits, and ultimately to return patient to prior level of function. Evaluation Complexity History MEDIUM  Complexity : 1-2 comorbidities / personal factors will impact the outcome/ POC ; Examination MEDIUM Complexity : 3 Standardized tests and measures addressing body structure, function, activity limitation and / or participation in recreation  ;Presentation MEDIUM Complexity : Evolving with changing characteristics  ; Clinical Decision Making MEDIUM Complexity : FOTO score of 26-74  Overall Complexity Rating: MEDIUM  Problem List: pain affecting function, decrease strength, decrease ADL/ functional abilitiies, decrease activity tolerance, and decrease flexibility/ joint mobility   Treatment Plan may include any combination of the following: Therapeutic exercise, Therapeutic activities, Neuromuscular re-education, Physical agent/modality, and Patient education  Patient / Family readiness to learn indicated by: asking questions, trying to perform skills, and interest  Persons(s) to be included in education: patient (P)  Barriers to Learning/Limitations: None  Patient Goal (s): to be pain free  Patient Self Reported Health Status: good  Rehabilitation Potential: good    Short Term Goals: To be accomplished in 2 weeks:  1. I and compliant with HEP for self management of symptoms. Long Term Goals: To be accomplished in 4 weeks:  1. Improve FOTO to 70 to indicate improved function with daily activities. 2. Increase right hip abd and ext strength to 4+/5 to increase ease with prolonged standing. 3. Increase right hamstring strength to 5/5 to increase ease with stairs. 4. Patient will report >=50% improvement to increase ease with work tasks. Frequency / Duration: Patient to be seen 2 times per week for 4 weeks. Patient/ Caregiver education and instruction: Diagnosis, prognosis, exercises   [x]  Plan of care has been reviewed with GEORGE Crandall, TIARA 10/7/2022 10:28 AM    ________________________________________________________________________    I certify that the above Therapy Services are being furnished while the patient is under my care. I agree with the treatment plan and certify that this therapy is necessary.     [de-identified] Signature:____________Date:_________TIME:________     Sarah Nino MD  ** Signature, Date and Time must be completed for valid certification **    Please sign and return to [facility] [address] [place] [address] & [employer]  [address] [facility] [address]  [place], [address]  [phone] fax

## 2022-10-07 NOTE — PROGRESS NOTES
[facility]  [address]  [place], [address]  [phone] fax    Plan of Care/Statement of Necessity for Occupational Therapy Services    Patient name: Emilie Viera Start of Care: 10/7/2022   Referral source: Ranjith Mccarty MD : 1980    Medical Diagnosis: Right wrist pain [M25.531]  Right elbow pain [M25.521]  Left elbow pain [M25.522]  Payor: Maggie Snow / Plan: Fortunato Carter / Product Type: Managed Care Medicaid /  Onset Date:    Treatment Diagnosis: sarah elbow pain   Prior Hospitalization: see medical history Provider#: [id number]   Medications: Verified on Patient summary List    Comorbidities: h/o right wrist arthroscopy in    Prior Level of Function: (I) with ADL/IADL tasks with functional limitations and pain using sarah UEs, massage therapist          The Plan of Care and following information is based on the information from the initial evaluation. Assessment/ key information: Patient is a left hand dominant 38 y.o. 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  Pt presents to skilled OT today rating her pain level 1/10 in sarah UEs that is worse with use and better with rest.  There is tenderness with palpation to sarah lateral epicondyles. Her sarah wrist and elbow AROM is WFL. There is limitation with sarah FA supination and discomfort with AROM. Her sarah hand  strength is good with pain in sarah wrists and lateral elbows. There is edema noted in the left elbow crease. Patient received an initial evaluation today followed by education as to diagnosis, precautions and treatment plan. Pt educated on activity modifications and wrist brace wear to reduce symptoms bilaterally.  Skilled OT services are necessary to address deficits, provide further education of activity and occupational modifications, and to improve quality of life. Evaluation Complexity: History LOW Complexity : Brief history review  Examination LOW Complexity : 1-3 performance deficits relating to physical, cognitive , or psychosocial skils that result in activity limitations and / or participation restrictions  Clinical Decision Making MEDIUM Complexity : Patient may present with comorbidities that affect occupational performnce. Miniml to moderate modification of tasks or assistance (eg, physical or verbal ) with assesment(s) is necessary to enable patient to complete evaluation   Overall Complexity Rating: LOW     Patient would benefit from OT/Hand therapy services for the following problems:  Problem List: Pain effecting function, Decreased range of motion, Decreased strength, Edema effecting function, Decreased coordination/prehension, Decreased ADL/functional abilities , Decreased activity tolerance, and Decreased flexibility/joint mobility     Treatment Plan may include any combination of the following: Therapeutic exercise, Therapeutic activities, Neuromuscular re-education, Physical agent/modality, Manual therapy, Patient education, and ADLs/IADLs  Patient / Family readiness to learn indicated by: asking questions, trying to perform skills, and interest  Persons(s) to be included in education:   patient (P)  Barriers to Learning/Limitations: None  Patient Goal (s): pain free  Patient Self Reported Health Status: good  Rehabilitation Potential: good  Short Term Goals: To be accomplished in 2  weeks:  Goal:* Patient will be compliant with initial home exercise program to take an active role in their rehabilitation process. Status at [facility]: not yet provided wrist AROM HEP    Goal:* Patient will demonstrate a good understanding of their condition and strategies for self-management.   Status at [facility]: pt educated on prognosis, diagnosis, activity modifications, treatment plan, wrist brace wear    Long Term Goals: To be accomplished in 4 weeks:   Goal:* Pt will report reduced pain, 3-4/10, with work related tasks using sarah UEs in order to perform massage therapy with less discomfort. Status at eval: 6/10 pain with use    Goal:* Pt will be (I) with strategies for self mgmt of symptoms in order to demonstrate a good understanding of her condition. Status at eval: Pt educated on prognosis, diagnosis, activity modifications, treatment plan, wrist brace wear     Goal:* Patient will have 70 degrees of right forearm supination in order to perform ADL's including washing face and accepting change. Status at Eval: 60 deg     Goal:* Patient will have 75 degrees of left forearm supination in order to perform ADL's including washing face and accepting change. Status at Eval: 71 deg    Goal:* Pt will improve FOTO outcome measure score by 5 points in order to demonstrate improved functional performance using sarah UEs. Status at eval: 69    Frequency / Duration: Patient to be seen 2 times per week for 4 weeks:  Patient/ Caregiver education and instruction: Diagnosis, prognosis, self care, activity modification, and exercises  [x]  Plan of care has been reviewed with Cornelio Mosley OT 10/7/2022 11:46 AM  ________________________________________________________________________    I certify that the above Therapy Services are being furnished while the patient is under my care. I agree with the treatment plan and certify that this therapy is necessary.     [facility] Signature:____________Date:_________TIME:________     Heath Casanova MD  ** Signature, Date and Time must be completed for valid certification **    Please sign and return to [facility] [address] [place] [address] & [facility]  [address]  [place], [address]  [phone] fax

## 2022-10-07 NOTE — PROGRESS NOTES
PT DAILY TREATMENT NOTE 10-18    Patient Name: Cassie Leal  Date:10/7/2022  : 1980  [x]  Patient  Verified  Payor: Jaqui Shay / Plan: [id number] [address] / Product Type: Managed Care Medicaid /    In time:948  Out time:1015  Total Treatment Time (min): 27  Visit #: 1 of 8    Treatment Area: Right knee pain [M25.561]  Right ankle pain [M25.571]    SUBJECTIVE  Pain Level (0-10 scale): 1  Any medication changes, allergies to medications, adverse drug reactions, diagnosis change, or new procedure performed?: [x] No    [] Yes (see summary sheet for update)  Subjective functional status/changes:   [] No changes reported  See eval    OBJECTIVE    27 min [x]Eval                  []Re-Eval       With   [] TE   [] TA   [] neuro   [] other: Patient Education: [x] Review HEP    [] Progressed/Changed HEP based on:   [] positioning   [] body mechanics   [] transfers   [] heat/ice application    [] other:      Other Objective/Functional Measures:      Pain Level (0-10 scale) post treatment: 1    ASSESSMENT/Changes in Function: see POC    Patient will continue to benefit from skilled PT services to modify and progress therapeutic interventions, address functional mobility deficits, address strength deficits, analyze and address soft tissue restrictions, and analyze and cue movement patterns to attain remaining goals. [x]  See Plan of Care  []  See progress note/recertification  []  See Discharge Summary         Progress towards goals / Updated goals:     Short Term Goals: To be accomplished in 2 weeks:  1. I and compliant with HEP for self management of symptoms. IE: issued HEP  Long Term Goals: To be accomplished in 4 weeks:  1. Improve FOTO to 70 to indicate improved function with daily activities. IE:66  2. Increase right hip abd and ext strength to 4+/5 to increase ease with prolonged standing. IE:abd 4/5; ext 3/5  3. Increase right hamstring strength to 5/5 to increase ease with stairs. IE:4-/5  4. Patient will report >=50% improvement to increase ease with work tasks.    IE:0%  PLAN  []  Upgrade activities as tolerated     [x]  Continue plan of care  []  Update interventions per flow sheet       []  Discharge due to:_  []  Other:_      Marie Eubanks, RAMYA, CMTPT 10/7/2022  10:34 AM    Future Appointments   Date Time Provider [facility]   10/7/2022 10:45 AM [facility]   12/23/2022  7:00 AM Vicenta Long MD [facility]

## 2022-10-14 ENCOUNTER — HOSPITAL ENCOUNTER (OUTPATIENT)
Dept: PHYSICAL THERAPY | Age: 42
Discharge: HOME | End: 2022-10-14
Attending: SPECIALIST
Payer: MEDICAID

## 2022-10-14 PROCEDURE — 97110 THERAPEUTIC EXERCISES: CPT

## 2022-10-14 PROCEDURE — 97530 THERAPEUTIC ACTIVITIES: CPT

## 2022-10-14 PROCEDURE — 97018 PARAFFIN BATH THERAPY: CPT

## 2022-10-14 PROCEDURE — 97112 NEUROMUSCULAR REEDUCATION: CPT

## 2022-10-14 PROCEDURE — 97035 APP MDLTY 1+ULTRASOUND EA 15: CPT

## 2022-10-14 NOTE — PROGRESS NOTES
PT DAILY TREATMENT NOTE     Patient Name: Suhas Lizama  Date:10/14/2022  : 1980  [x]  Patient  Verified  Payor: Mika Merida / Plan: Siena Lebron / Product Type: Managed Care Medicaid /    In time:10:00  Out time:10:43  Total Treatment Time (min): 43  Visit #: 2 of 8    Treatment Area: Right knee pain [M25.561]  Right ankle pain [M25.571]    SUBJECTIVE  Pain Level (0-10 scale): 1  Any medication changes, allergies to medications, adverse drug reactions, diagnosis change, or new procedure performed?: [x] No    [] Yes (see summary sheet for update)  Subjective functional status/changes:   [] No changes reported  Pt states she hasn't been able to try all of the home exercises 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    OBJECTIVE    25 min Therapeutic Exercise:  [x] See flow sheet :   Rationale: increase ROM and increase strength to improve the patients ability to perform ADLs    8 min Therapeutic Activity:  [x]  See flow sheet :   Rationale: increase strength, improve coordination, and increase proprioception  to improve the patients ability to increase ease with daily tasks     10 min Neuromuscular Re-education:  [x]  See flow sheet :   Rationale: increase strength, improve coordination, and increase proprioception  to improve the patients ability to perform functional tasks           With   [] TE   [] TA   [] neuro   [] other: Patient Education: [x] Review HEP    [] Progressed/Changed HEP based on:   [] positioning   [] body mechanics   [] transfers   [] heat/ice application    [] other:      Other Objective/Functional Measures:   First visit after Eval     Pain Level (0-10 scale) post treatment: 1    ASSESSMENT/Changes in Function: Initiated treatment program per flow sheet. Pt c/o right knee \"popping\" throughout treatment. Glutes fatigue easily . Pt reports fatigue at end of treatment and IT tightness.      Patient will continue to benefit from skilled PT services to modify and progress therapeutic interventions, address functional mobility deficits, address ROM deficits, address strength deficits, analyze and address soft tissue restrictions, analyze and cue movement patterns, analyze and modify body mechanics/ergonomics, and assess and modify postural abnormalities to attain remaining goals. []  See Plan of Care  []  See progress note/recertification  []  See Discharge Summary         Progress towards goals / Updated goals:  Short Term Goals: To be accomplished in 2 weeks:  1. I and compliant with HEP for self management of symptoms. IE: issued HEP  Current: pt reports min compliance d/t work schedule 10/14/2022  Long Term Goals: To be accomplished in 4 weeks:  1. Improve FOTO to 70 to indicate improved function with daily activities. IE:66  2. Increase right hip abd and ext strength to 4+/5 to increase ease with prolonged standing. IE:abd 4/5; ext 3/5  3. Increase right hamstring strength to 5/5 to increase ease with stairs. IE:4-/5  4. Patient will report >=50% improvement to increase ease with work tasks.    IE:0%    PLAN  []  Upgrade activities as tolerated     [x]  Continue plan of care  []  Update interventions per flow sheet       []  Discharge due to:_  []  Other:_      Shanice Marquez, PTA 10/14/2022  9:55 AM    Future Appointments   Date Time Provider Ronald Vega   10/14/2022 10:00 AM Harrison Costello PTA [facility]   10/14/2022 10:45 AM Abimael Coker BREANNA [facility]   11/4/2022  8:30 AM Hope Hoskins OT [facility]   11/4/2022  9:45 AM Sandoval Zelaya PTA [facility]   12/23/2022  7:00 AM Adama Long MD [facility]

## 2022-10-14 NOTE — PROGRESS NOTES
OT DAILY TREATMENT NOTE     Patient Name: Pranav Falk  Date:10/14/2022  : 1980  [x]  Patient  Verified  Payor: Humberto Valdez / Plan: Bria Honey / Product Type: Managed Care Medicaid /    In time:10:52  Out time:11: 33  Total Treatment Time (min): 41  Visit #: 2 of 8    Treatment Area: Right wrist pain [M25.531]  Right elbow pain [M25.521]  Left elbow pain [M25.522]    SUBJECTIVE  Pain Level (0-10 scale): 2/10  Any medication changes, allergies to medications, adverse drug reactions, diagnosis change, or new procedure performed?: [x] No    [] Yes (see summary sheet for update)  Subjective functional status/changes:   [] No changes reported    \"Hard because of the nature of my job\"   \"The massage makes it feel better\"    OBJECTIVE    Modality rationale: decrease edema, decrease inflammation, decrease pain, and increase tissue extensibility to improve the patients ability to move wrist and  for functional daily tasks.     Min Type Additional Details    [] Estim:  []Unatt       []IFC  []Premod                        []Other:  []w/ice   []w/heat  Position:  Location:    [] Estim: []Att    []TENS instruct  []NMES                    []Other:  []w/US   []w/ice   []w/heat  Position:  Location:    []  Traction: [] Cervical       []Lumbar                       [] Prone          []Supine                       []Intermittent   []Continuous Lbs:  [] before manual  [] after manual   10 [x]  Ultrasound: []Continuous   [x] Pulsed                           []1MHz   [x]3MHz W/cm2: 1.0  Location: right lateral epicondyle    []  Iontophoresis with dexamethasone         Location: [] Take home patch   [] In clinic   8   Concurrent    []  Ice     [x]  Heat- MHP   []  Ice massage  []  Laser   [x]  Paraffin Position: seated/resting  Location: sarah hands/wrist, sarah elbows    []  Laser with stim  []  Other:  Position:  Location:    []  Vasopneumatic Device    []  Right     []  Left  Pre-treatment girth:  Post-treatment girth:  Measured at (location):  Pressure:       [] lo [] med [] hi   Temperature: [] lo [] med [] hi     [x] Skin assessment post-treatment:  [x]intact []redness- no adverse reaction    []redness - adverse reaction:     15 min Therapeutic Exercise:  [] See flow sheet :   Rationale: increase ROM to improve the patients ability to move jurgen elbows and wrist for functional daily tasks. Jurgen UE's: Ball rolls  Scapular squeeze   Shrugs   Phase II stretches a and b only       8 sc min Manual Therapy:  IASTM #4 using sweeping techniques    The manual therapy interventions were performed at a separate and distinct time from the therapeutic activities interventions. Rationale: decrease pain, increase ROM, increase tissue extensibility, and decrease edema  to right lateral epicondyle and forearm extensor       8 min Self Care/Home Management: heat, activity modification strategies, massage strategies, precautions. Rationale:  patient education   to improve the patients ability to self-manage symptoms. With   [] TE   [] TA   [] neuro   [] other: Patient Education: [] Review HEP    [] Progressed/Changed HEP based on:   [] positioning   [] body mechanics   [] transfers   [] heat/ice application   [] Splint wear/care   [] Sensory re-education   [] scar management      [] other:            Other Objective/Functional Measures:     Pt tolerated all UE ROM well   Slow pace with wrist and elbow ROM to start exercises     Pain Level (0-10 scale) post treatment: 1/10     ASSESSMENT/Changes in Function: initiated gentle ROM exercises to elbow and wrist, initiated IASTM and therapeutic ultrasound and pt tolerated well, decreased pain with heat and massage modalities, progress stretches as tolerated, reviewed diagnosis and how job can exacerbate symptoms.       Patient will continue to benefit from skilled OT services to address ROM deficits, address strength deficits, analyze and address soft tissue restrictions, analyze and cue movement patterns, and analyze and modify body mechanics/ergonomics to attain remaining goals. [x]  See Plan of Care  []  See progress note/recertification  []  See Discharge Summary         Progress towards goals / Updated goals:    Short Term Goals: To be accomplished in 2  weeks:  Goal:* Patient will be compliant with initial home exercise program to take an active role in their rehabilitation process. Status at Eval: not yet provided wrist AROM HEP     Goal:* Patient will demonstrate a good understanding of their condition and strategies for self-management. Status at Eval: pt educated on prognosis, diagnosis, activity modifications, treatment plan, wrist brace wear     Long Term Goals: To be accomplished in 4 weeks:          Goal:* Pt will report reduced pain, 3-4/10, with work related tasks using sarah UEs in order to perform massage therapy with less discomfort. Status at eval: 6/10 pain with use     Goal:* Pt will be (I) with strategies for self mgmt of symptoms in order to demonstrate a good understanding of her condition. Status at eval: Pt educated on prognosis, diagnosis, activity modifications, treatment plan, wrist brace wear      Goal:* Patient will have 70 degrees of right forearm supination in order to perform ADL's including washing face and accepting change. Status at Eval: 60 deg      Goal:* Patient will have 75 degrees of left forearm supination in order to perform ADL's including washing face and accepting change. Status at Eval: 71 deg     Goal:* Pt will improve FOTO outcome measure score by 5 points in order to demonstrate improved functional performance using sarah UEs.   Status at eval: 71       PLAN  []  Upgrade activities as tolerated     [x]  Continue plan of care  []  Update interventions per flow sheet       []  Discharge due to:_  []  Other:_      BREANNA Bean 10/14/2022  10:32 AM    Future Appointments   Date Time Provider Department [place]   10/14/2022 10:45 AM [facility]   11/4/2022  8:30 AM [facility]   11/4/2022  9:45 AM [facility]   12/23/2022  7:00 AM Simin Long MD [facility]

## 2022-11-04 ENCOUNTER — HOSPITAL ENCOUNTER (OUTPATIENT)
Dept: PHYSICAL THERAPY | Age: 42
Discharge: HOME | End: 2022-11-04
Attending: SPECIALIST
Payer: MEDICAID

## 2022-11-04 PROCEDURE — 97112 NEUROMUSCULAR REEDUCATION: CPT

## 2022-11-04 PROCEDURE — 97535 SELF CARE MNGMENT TRAINING: CPT

## 2022-11-04 PROCEDURE — 97110 THERAPEUTIC EXERCISES: CPT

## 2022-11-04 PROCEDURE — 97530 THERAPEUTIC ACTIVITIES: CPT

## 2022-11-04 PROCEDURE — 97018 PARAFFIN BATH THERAPY: CPT

## 2022-11-04 NOTE — PROGRESS NOTES
[facility]  [address]  [place], [address]  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 Updated Goals: to be achieved in 4 weeks:   Short Term Goals: To be accomplished in 2 weeks:  1. I and compliant with HEP for self management of symptoms. IE: issued HEP  Current: pt reports min compliance d/t work schedule 10/14/2022  Long Term Goals: To be accomplished in 4 weeks:  1. Improve FOTO to 70 to indicate improved function with daily activities. IE:66  Current:Regressed to 62. 2022  2. Increase right hip abd and ext strength to 4+/5 to increase ease with prolonged standing. IE:abd 4/5; ext 3/5  Current: Abduction 4/5; extension 3+/5. 2022  3. Increase right hamstring strength to 5/5 to increase ease with stairs. IE:4-/5  Current: progressed to 4/5. 2022  4.  Patient will report >=50% improvement to increase ease with work tasks. IE:0%  Current: No change reported. 11/04/2022       ASSESSMENT/RECOMMENDATIONS: Pt continues to have pain when performing sit to stands and when standing at work. Pt has to stand for prolonged periods of time for work and has pain constantly in her knee when standing. Pt inquired during treatment about her weight in relation to her knee pain,  weight loss was discussed with patients with regards to relieving pressure on her LE joints in which patient acknowledged understanding. Pt has had limited attendance with PT due to work schedule. Patient will continue to benefit from skilled PT services to modify and progress therapeutic interventions, address functional mobility deficits, address ROM deficits, address strength deficits, analyze and address soft tissue restrictions, analyze and cue movement patterns, analyze and modify body mechanics/ergonomics, and assess and modify postural abnormalities to attain remaining goals.   [x]Continue therapy per initial plan/protocol at a frequency of  1 x per week for 4 weeks  []Continue therapy with the following recommended changes:_____________________      _____________________________________________________________________  []Discontinue therapy progressing towards or have reached established goals  []Discontinue therapy due to lack of appreciable progress towards goals  []Discontinue therapy due to lack of attendance or compliance  []Await Physician's recommendations/decisions regarding therapy  []Other:________________________________________________________________    Thank you for this referral.    Ariana English, PTA 11/4/2022 11:37 AM  RAMYA Downs CMTPT  NOTE TO PHYSICIAN:  [address]   [facility]: [phone]  If you are unable to process this request in 24 hours please contact our office: [phone]    I have read the above report and request that my patient continue as recommended. I have read the above report and request that my patient continue therapy with the following changes/special instructions:__________________________________________________________  I have read the above report and request that my patient be discharged from therapy.     Physicians signature: ______________________________Date: ______Time:______     Lauren Calderon MD

## 2022-11-04 NOTE — PROGRESS NOTES
PT DAILY TREATMENT NOTE     Patient Name: Tisha Apley  Date:2022  : 1980  [x]  Patient  Verified  Payor: Jerry Chang / Plan: [employer] / Product Type: Managed Care Medicaid /    In time:9:43  Out time:10:23  Total Treatment Time (min): 40  Visit #: 3 of 8    Medicare/BCBS Only   Total Timed Codes (min):  40 1:1 Treatment Time:  40       Treatment Area: Right knee pain [M25.561]  Right ankle pain [M25.571]    SUBJECTIVE  Pain Level (0-10 scale): 1/10  Any medication changes, allergies to medications, adverse drug reactions, diagnosis change, or new procedure performed?: [x] No    [] Yes (see summary sheet for update)  Subjective functional status/changes:   [] No changes reported  Pt reports pain in her knee and reports its painful when she stands up from sitting. OBJECTIVE    20 min Therapeutic Exercise:  [x] See flow sheet :   Rationale: increase ROM and increase strength to improve the patients ability to perform ADLs with increased ease. 10 min Therapeutic Activity:  [x]  See flow sheet :   Rationale: increase strength, improve coordination, and increase proprioception  to improve the patients ability to perform ADLs with increased ease. 10 min Neuromuscular Re-education:  [x]  See flow sheet :   Rationale: increase strength, improve coordination, and increase proprioception  to improve the patients ability to perform ADLs with increased ease. With   [] TE   [] TA   [] neuro   [] other: Patient Education: [x] Review HEP    [] Progressed/Changed HEP based on:   [] positioning   [] body mechanics   [] transfers   [] heat/ice application    [] other:      Other Objective/Functional Measures: FOTO: 62     Pain Level (0-10 scale) post treatment: 1/10    ASSESSMENT/Changes in Function: Pt continues to have pain when performing sit to stands and when standing at work.   Pt has to stand for prolonged periods of time for work and has pain constantly in her knee when standing. Pt inquired during treatment about her weight in relation to her knee pain,  weight loss was discussed with patients with regards to relieving pressure on her LE joints in which patient acknowledged understanding. Patient will continue to benefit from skilled PT services to modify and progress therapeutic interventions, address functional mobility deficits, address ROM deficits, address strength deficits, analyze and address soft tissue restrictions, analyze and cue movement patterns, analyze and modify body mechanics/ergonomics, and assess and modify postural abnormalities to attain remaining goals. []  See Plan of Care  [x]  See progress note/recertification  []  See Discharge Summary         Progress towards goals / Updated goals:  Short Term Goals: To be accomplished in 2 weeks:  1. I and compliant with HEP for self management of symptoms. IE: issued HEP  Current: pt reports min compliance d/t work schedule 10/14/2022  Long Term Goals: To be accomplished in 4 weeks:  1. Improve FOTO to 70 to indicate improved function with daily activities. IE:66  Current:Regressed to 62. 11/04/2022  2. Increase right hip abd and ext strength to 4+/5 to increase ease with prolonged standing. IE:abd 4/5; ext 3/5  Current: Abduction 4/5; extension 3+/5. 11/04/2022  3. Increase right hamstring strength to 5/5 to increase ease with stairs. IE:4-/5  Current: progressed to 4/5. 11/04/2022  4. Patient will report >=50% improvement to increase ease with work tasks. IE:0%  Current: No change reported.  11/04/2022    PLAN  []  Upgrade activities as tolerated     [x]  Continue plan of care  []  Update interventions per flow sheet       []  Discharge due to:_  []  Other:_      Anupam Hurd, GEORGE 11/4/2022  9:48 AM    Future Appointments   Date Time Provider Ronald Silvia   12/23/2022  7:00 AM MD CHERISE Riggs AMB

## 2022-11-04 NOTE — PROGRESS NOTES
OT DAILY TREATMENT NOTE     Patient Name: Rebecca Glassing  Date:2022  : 1980  [x]  Patient  Verified  Payor: Yeni Hein / Plan: [employer] / Product Type: Managed Care Medicaid /    In time:8:32 AM  Out time:9:14 AM  Total Treatment Time (min): 42  Visit #: 3 of 8    Treatment Area: Right wrist pain [M25.531]  Right elbow pain [M25.521]  Left elbow pain [M25.522]    SUBJECTIVE  Pain Level (0-10 scale): 210  Any medication changes, allergies to medications, adverse drug reactions, diagnosis change, or new procedure performed?: [x] No    [] Yes (see summary sheet for update)  Subjective functional status/changes:   [] No changes reported  \"The pain is in the inside of both elbows now. \"    OBJECTIVE    Modality rationale: decrease pain and increase tissue extensibility to improve the patients ability to functionally use sarah UEs.     Min Type Additional Details    [] Estim:  []Unatt       []IFC  []Premod                        []Other:  []w/ice   []w/heat  Position:  Location:    [] Estim: []Att    []TENS instruct  []NMES                    []Other:  []w/US   []w/ice   []w/heat  Position:  Location:    []  Traction: [] Cervical       []Lumbar                       [] Prone          []Supine                       []Intermittent   []Continuous Lbs:  [] before manual  [] after manual    []  Ultrasound: []Continuous   [] Pulsed                           []1MHz   []3MHz W/cm2:  Location:    []  Iontophoresis with dexamethasone         Location: [] Take home patch   [] In clinic   15 []  Ice     [x]  Heat MHP  []  Ice massage  []  Laser   [x]  Paraffin Position:seated, resting  Location: sarah elbows, sarah wrists    []  Laser with stim  []  Other:  Position:  Location:    []  Vasopneumatic Device    []  Right     []  Left  Pre-treatment girth:  Post-treatment girth:  Measured at (location):  Pressure:       [] lo [] med [] hi   Temperature: [] lo [] med [] hi       [x] Skin assessment post-treatment:  [x]intact [x]redness- no adverse reaction    []redness - adverse reaction:     17 min Therapeutic Exercise:  [x] See flow sheet :   Rationale:increase ROM to improve the patients ability to move jurgen elbows and wrist for functional daily tasks. Jurgen UE's    Ball rolls  Scap Squeezes  Shoulder shrugs  Wrist AROM/PROM stretches    10 min Self Care/Home Management: heat, activity modification strategies, massage strategies, precautions, compression garment wear on elbows   Rationale:  education   to improve the patients ability to reduce symptoms and improve functional use of jurgen UEs. With   [] TE   [] TA   [] neuro   [] other: Patient Education: [x] Review HEP    [] Progressed/Changed HEP based on:   [] positioning   [] body mechanics   [] transfers   [] heat/ice application   [] Splint wear/care   [] Sensory re-education   [] scar management      [] other:            Other Objective/Functional Measures:   Range of Motion:    Elbow/Wrist   Wrist 10/7/2022  Right/Left 11/4/2022  Right/Left    Flex 70/68 73/79    Ext 58/61  62/64   UD 48/40     RD 24/25     FA        Pro 87/89  90/90   Sup 60/71  81/64   Elbow       Flex 150/150     Ext 0/0           Strength:   Measurements: Taken with Fadi Dynamometer, in Lbs   Level 2 10/7/2022  Date   Right - elbow bent, FA neutral 56     Right - elbow ext, FA pronated 55     Left - elbow bent, FA neutral 53     Left - elbow ext, FA pronated 60     Deficit       Change               Sensation:    intact        Edema: GIRTH CHART measured in cm  Date: 10/7/2022  11/4/2022     Side Right/Left Right/Left    DPC circum. 19.9/20.4     Wrist Crease 16.7/16.8     FA        Elbow 29.0/29.8  29.4/29.9         Pain Level (0-10 scale) post treatment: 2/10    ASSESSMENT/Changes in Function: Pt presents today for reassessment. She has only been seen for one other follow up appointment since her initial evaluation. She states she cannot come more than 1x per week. There is improved sarah wrist AROM and reduced pain with rest.  Minimal progress towards other girls due to limited visits since initial evaluation. Patient will continue to benefit from skilled OT services to modify and progress therapeutic interventions, address ROM deficits, address strength deficits, analyze and address soft tissue restrictions, and instruct in home and community integration to attain remaining goals. []  See Plan of Care  [x]  See progress note/recertification  []  See Discharge Summary         Progress towards goals / Updated goals:  Short Term Goals: To be accomplished in 2  weeks:  Goal:* Patient will be compliant with initial home exercise program to take an active role in their rehabilitation process. Status at Eval: not yet provided wrist AROM HEP   Status at PN 11/4/2022 - Pt was provided wrist AROM on 10/14/2022 and reports performing wrist AROM 2x per day, goal met    Goal:* Patient will demonstrate a good understanding of their condition and strategies for self-management. Status at Eval: pt educated on prognosis, diagnosis, activity modifications, treatment plan, wrist brace wear   Status at PN 11/4/2022 - pt reports poor compliance with wrist brace wear during this day, she is wearing a right wrist brace at night. 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      Long Term Goals: To be accomplished in 4 weeks:          Goal:* Pt will report reduced pain, 3-4/10, with work related tasks using sarah UEs in order to perform massage therapy with less discomfort. Status at [facility]: 6/10 pain with use   Status at PN 11/4/2022 - 4/10 with work related activities, will continue to monitor due to limited visits    Goal:* Pt will be (I) with strategies for self mgmt of symptoms in order to demonstrate a good understanding of her condition.   Status at eval: Pt educated on prognosis, diagnosis, activity modifications, treatment plan, wrist brace wear   Status at PN 11/4/2022 - moderate cueing for strategies for self mgmt of symptoms. Goal:* Patient will have 70 degrees of right forearm supination in order to perform ADL's including washing face and accepting change. Status at Eval: 60 deg   Status at PN 11/4/2022 - 81 deg, goal met     Goal:* Patient will have 75 degrees of left forearm supination in order to perform ADL's including washing face and accepting change. Status at Eval: 71 deg   Status at PN 11/4/2022 - 64 deg (-7)    Goal:* Pt will improve FOTO outcome measure score by 5 points in order to demonstrate improved functional performance using sarah UEs.   Status at eval: 69  Status at PN 11/4/2022 - 62 (-2)    PLAN  [x]  Upgrade activities as tolerated     [x]  Continue plan of care  []  Update interventions per flow sheet       []  Discharge due to:_  []  Other:_      Brett Bello OT 11/4/2022  8:32 AM    Future Appointments   Date Time Provider Ronald Vega   11/4/2022  9:45 AM Claudia Wallace PTA [facility]   12/23/2022  7:00 AM Vipul Long MD [facility] AMB

## 2022-11-04 NOTE — PROGRESS NOTES
[facility]  [address] [facility] [address]  [place], [address]  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 Status at Eval: not yet provided wrist AROM HEP   Status at PN 2022 - Pt was provided wrist AROM on 10/14/2022 and reports performing wrist AROM 2x per day, goal met    Goal:* Patient will demonstrate a good understanding of their condition and strategies for self-management. Status at Eval: pt educated on prognosis, diagnosis, activity modifications, treatment plan, wrist brace wear   Status at PN 2022 - pt reports poor compliance with wrist brace wear during this day, she is wearing a right wrist brace at night. 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      Long Term Goals: To be accomplished in 4 weeks:          Goal:* Pt will report reduced pain, 3-4/10, with work related tasks using sarah UEs in order to perform massage therapy with less discomfort.    Status at eval: 6/10 pain with use   Status at  2022 - 4/10 with work related activities, will continue to monitor due to limited visits    Goal:* Pt will be (I) with strategies for self mgmt of symptoms in order to demonstrate a good understanding of her condition. Status at eval: Pt educated on prognosis, diagnosis, activity modifications, treatment plan, wrist brace wear   Status at PN 11/4/2022 - moderate cueing for strategies for self mgmt of symptoms. Goal:* Patient will have 70 degrees of right forearm supination in order to perform ADL's including washing face and accepting change. Status at Eval: 60 deg   Status at PN 11/4/2022 - 81 deg, goal met     Goal:* Patient will have 75 degrees of left forearm supination in order to perform ADL's including washing face and accepting change. Status at Eval: 71 deg   Status at PN 11/4/2022 - 64 deg (-7)    Goal:* Pt will improve FOTO outcome measure score by 5 points in order to demonstrate improved functional performance using sarah UEs. Status at eval: 69  Status at PN 11/4/2022 - 62 (-2)    Key Functional Changes: improved wrist AROM, reduced pain with rest    Updated Goals: to be achieved in 4 weeks:  Goal:* Patient will demonstrate a good understanding of their condition and strategies for self-management. Status at Eval: pt educated on prognosis, diagnosis, activity modifications, treatment plan, wrist brace wear   Status at PN 11/4/2022 - pt reports poor compliance with wrist brace wear during this day, she is wearing a right wrist brace at night. 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            Goal:* Pt will report reduced pain, 3-4/10, with work related tasks using sarah UEs in order to perform massage therapy with less discomfort.    Status at eval: 6/10 pain with use   Status at PN 11/4/2022 - 4/10 with work related activities, will continue to monitor due to limited visits    Goal:* Pt will be (I) with strategies for self mgmt of symptoms in order to demonstrate a good understanding of her condition. Status at eval: Pt educated on prognosis, diagnosis, activity modifications, treatment plan, wrist brace wear   Status at PN 11/4/2022 - moderate cueing for strategies for self mgmt of symptoms. Goal:* Patient will have 75 degrees of left forearm supination in order to perform ADL's including washing face and accepting change. Status at Eval: 71 deg   Status at PN 11/4/2022 - 64 deg (-7)    Goal:* Pt will improve FOTO outcome measure score by 5 points in order to demonstrate improved functional performance using sarah UEs. Status at eval: 69  Status at PN 11/4/2022 - 62 (-2)    ASSESSMENT/RECOMMENDATIONS:  []Continue therapy per initial plan/protocol at a frequency of  1-2 x per week for 4 weeks  []Continue therapy with the following recommended changes:_____________________      _____________________________________________________________________  []Discontinue therapy progressing towards or have reached established goals  []Discontinue therapy due to lack of appreciable progress towards goals  []Discontinue therapy due to lack of attendance or compliance  []Await Physician's recommendations/decisions regarding therapy  []Other:________________________________________________________________    Thank you for this referral.   Tracy Sandoval, OT 11/4/2022 9:23 AM  NOTE TO PHYSICIAN:  [address]   [facility]: [phone]  If you are unable to process this request in 24 hours please contact our office: [phone]    I have read the above report and request that my patient continue as recommended. I have read the above report and request that my patient continue therapy with the following changes/special instructions:__________________________________________________________  I have read the above report and request that my patient be discharged from therapy.     [address] [place] [address] Signature:____________Date:_________TIME:________     Ricky Morrow, MD  ** Signature, Date and Time must be completed for valid certification **

## 2022-11-11 ENCOUNTER — TELEPHONE (OUTPATIENT)
Dept: PHYSICAL THERAPY | Age: 42
End: 2022-11-11

## 2022-11-30 NOTE — PROGRESS NOTES
[facility]  [address] [facility] [address]  [place], [address]  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 Status at Eval: pt educated on prognosis, diagnosis, activity modifications, treatment plan, wrist brace wear   Status at PN 2022 - pt reports poor compliance with wrist brace wear during this day, she is wearing a right wrist brace at night. 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            Goal:* Pt will report reduced pain, 3-4/10, with work related tasks using sarah UEs in order to perform massage therapy with less discomfort. Status at eval: 6/10 pain with use   Status at PN 2022 - 410 with work related activities, will continue to monitor due to limited visits     Goal:* Pt will be (I) with strategies for self mgmt of symptoms in order to demonstrate a good understanding of her condition.   Status at eval: Pt educated on prognosis, diagnosis, activity modifications, treatment plan, wrist brace wear   Status at PN 2022 - moderate cueing for strategies for self mgmt of symptoms. Goal:* Patient will have 75 degrees of left forearm supination in order to perform ADL's including washing face and accepting change. Status at Eval: 71 deg   Status at PN 11/4/2022 - 64 deg (-7)     Goal:* Pt will improve FOTO outcome measure score by 5 points in order to demonstrate improved functional performance using sarah UEs. Status at eval: 69  Status at PN 11/4/2022 - 62 (-2)    ASSESSMENT/RECOMMENDATIONS: unable to further assess goals due to pt requests self d/c with no reason given. Will d/c at this time. Thank you for this referral.    [x]Discontinue therapy: []Patient has reached or is progressing toward set goals      [x]Patient is non-compliant or has abdicated      []Due to lack of appreciable progress towards set goals    Brett Bello OT 11/30/2022 9:35 AM    NOTE TO PHYSICIAN:  Please complete the following and fax to: [facility] at [facility] at [phone]  . Retain this original for your records. If you are unable to process this request in   24 hours, please contact our office.      [] I have read the above report and request that my patient continue therapy with the following changes/special instructions:  [] I have read the above report and request that my patient be discharged from therapy    Physician's Signature:____________Date:_________TIME:________     Ajit Carvalho MD  ** Signature, Date and Time must be completed for valid certification **

## 2022-12-02 ENCOUNTER — APPOINTMENT (OUTPATIENT)
Dept: PHYSICAL THERAPY | Age: 42
End: 2022-12-02
Attending: SPECIALIST

## 2022-12-09 ENCOUNTER — TELEPHONE (OUTPATIENT)
Dept: PHYSICAL THERAPY | Age: 42
End: 2022-12-09

## 2022-12-09 ENCOUNTER — HOSPITAL ENCOUNTER (OUTPATIENT)
Dept: PHYSICAL THERAPY | Age: 42
End: 2022-12-09
Attending: SPECIALIST

## 2022-12-09 ENCOUNTER — APPOINTMENT (OUTPATIENT)
Dept: PHYSICAL THERAPY | Age: 42
End: 2022-12-09
Attending: SPECIALIST

## 2022-12-09 NOTE — PROGRESS NOTES
[facility]  [address] [facility] [address]  [place], [address]  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 I and compliant with HEP for self management of symptoms. IE: issued HEP  Current: pt reports min compliance d/t work schedule   Long Term Goals: To be accomplished in 4 weeks:  1. Improve FOTO to 70 to indicate improved function with daily activities. IE:66  Current:Regressed to 62.  2. Increase right hip abd and ext strength to 4+/5 to increase ease with prolonged standing. IE:abd 4/5; ext 3/5  Current: Abduction 4/5; extension 3+/5.   3. Increase right hamstring strength to 5/5 to increase ease with stairs. IE:4-/5  Current: progressed to 4/5.   4. Patient will report >=50% improvement to increase ease with work tasks. IE:0%  Current: No change reported. Patient has not returned to PT since 22; unable to reassess goals; unplanned D/C. She attended 3 visits in 2 months.     ASSESSMENT/RECOMMENDATIONS:  [x]Discontinue therapy: []Patient has reached or is progressing toward set goals      [x]Patient is non-compliant or has abdicated      [x]Due to lack of appreciable progress towards set cinda    Jacqueline Paget, PT 12/9/2022 9:22 AM    NOTE TO PHYSICIAN:  Please complete the following and fax to: [facility] at [facility] at [phone]  . Retain this original for your records. If you are unable to process this request in   24 hours, please contact our office.      [] I have read the above report and request that my patient continue therapy with the following changes/special instructions:  [] I have read the above report and request that my patient be discharged from therapy    Physician's Signature:____________Date:_________TIME:________     aGbino Jacob MD  ** Signature, Date and Time must be completed for valid certification **

## 2022-12-16 ENCOUNTER — APPOINTMENT (OUTPATIENT)
Dept: PHYSICAL THERAPY | Age: 42
End: 2022-12-16
Attending: SPECIALIST

## 2022-12-23 ENCOUNTER — OFFICE VISIT (OUTPATIENT)
Dept: FAMILY MEDICINE CLINIC | Age: 42
End: 2022-12-23
Payer: MEDICAID

## 2022-12-23 ENCOUNTER — APPOINTMENT (OUTPATIENT)
Dept: PHYSICAL THERAPY | Age: 42
End: 2022-12-23
Attending: SPECIALIST

## 2022-12-23 VITALS
SYSTOLIC BLOOD PRESSURE: 129 MMHG | OXYGEN SATURATION: 98 % | HEART RATE: 100 BPM | BODY MASS INDEX: 50.02 KG/M2 | RESPIRATION RATE: 16 BRPM | WEIGHT: 293 LBS | DIASTOLIC BLOOD PRESSURE: 89 MMHG | HEIGHT: 64 IN

## 2022-12-23 DIAGNOSIS — R09.81 SINUS CONGESTION: ICD-10-CM

## 2022-12-23 DIAGNOSIS — D50.0 IRON DEFICIENCY ANEMIA DUE TO CHRONIC BLOOD LOSS: ICD-10-CM

## 2022-12-23 DIAGNOSIS — R21 RASH AND NONSPECIFIC SKIN ERUPTION: ICD-10-CM

## 2022-12-23 DIAGNOSIS — D50.0 IRON DEFICIENCY ANEMIA DUE TO CHRONIC BLOOD LOSS: Primary | ICD-10-CM

## 2022-12-23 DIAGNOSIS — I10 ESSENTIAL HYPERTENSION: ICD-10-CM

## 2022-12-23 DIAGNOSIS — E66.01 CLASS 3 SEVERE OBESITY WITH SERIOUS COMORBIDITY AND BODY MASS INDEX (BMI) OF 50.0 TO 59.9 IN ADULT, UNSPECIFIED OBESITY TYPE (HCC): ICD-10-CM

## 2022-12-23 RX ORDER — HYDROXYZINE 50 MG/1
50 TABLET, FILM COATED ORAL
Qty: 30 TABLET | Refills: 1 | Status: SHIPPED | OUTPATIENT
Start: 2022-12-23

## 2022-12-23 RX ORDER — AMLODIPINE BESYLATE 2.5 MG/1
TABLET ORAL
COMMUNITY
Start: 2022-11-29

## 2022-12-23 NOTE — PROGRESS NOTES
Britney Lutz is a 38 y.o. female presenting today for Follow Up Chronic Condition (Believes she has a cold. Chest congestion with productive cough and yellow phlegm, since Wednesday. Denies fever. Was recently started on Amlodipine. )  . Chief Complaint   Patient presents with    Follow Up Chronic Condition     Believes she has a cold. Chest congestion with productive cough and yellow phlegm, since Wednesday. Denies fever. Was recently started on Amlodipine. HPI:  Britney Lutz presents to the office today for follow up. Patient has a PMHx of Obesity, iron deficiency anemia, eczema. HTN: She was recently started on amlodipine 2.5 mg daily due to elevated BP readings. She has knee pain intermittently but has recently worsened. It is worse while walking. This is in the same knee that she had injured in the past.  She was seen by Dr. Indu Blount and referred to PT. Itchy skin: Patient is now following with dermatology. States that the rash and itching has improved. Currently using clobetasol and Xyzal.    Anemia: Patient has a history of iron deficiency anemia. Currently taking iron supplements daily. Recent hemoglobin in 11/22 was 11.1 with MCV 76.7. Ferritin 7.9. Obesity: Patient is following with bariatrics for consideration of surgery. Today, she reports a mild sore throat, cough and sinus congestion. Denies any fever, chills or shortness of breath. She has been using over-the-counter medication such as Mucinex and warm liquids with improvement. Review of Systems   Constitutional:  Negative for chills, diaphoresis, fever, malaise/fatigue and weight loss. HENT:  Negative for congestion, ear discharge, ear pain, hearing loss, nosebleeds, sinus pain, sore throat and tinnitus. Eyes:  Negative for blurred vision, double vision and photophobia. Respiratory:  Negative for cough, sputum production, shortness of breath, wheezing and stridor.     Cardiovascular: Negative for chest pain, palpitations, orthopnea, claudication and leg swelling. Gastrointestinal:  Negative for abdominal pain, constipation, diarrhea, heartburn, nausea and vomiting. Genitourinary:  Negative for dysuria, flank pain, frequency, hematuria and urgency. Musculoskeletal:  Positive for joint pain. Negative for back pain, myalgias and neck pain. Skin:  Positive for itching and rash. Neurological:  Negative for tingling, tremors, sensory change, speech change, focal weakness, seizures, weakness and headaches. Psychiatric/Behavioral:  Positive for depression. Negative for suicidal ideas. The patient is nervous/anxious. All other systems reviewed and are negative.     Allergies   Allergen Reactions    Peanut Itching and Swelling    Tree Nut Itching and Swelling       PHQ Screening   3 most recent PHQ Screens 12/23/2022   Little interest or pleasure in doing things Not at all   Feeling down, depressed, irritable, or hopeless Not at all   Total Score PHQ 2 0   Trouble falling or staying asleep, or sleeping too much -   Feeling tired or having little energy -   Poor appetite, weight loss, or overeating -   Feeling bad about yourself - or that you are a failure or have let yourself or your family down -   Trouble concentrating on things such as school, work, reading, or watching TV -   Moving or speaking so slowly that other people could have noticed; or the opposite being so fidgety that others notice -   Thoughts of being better off dead, or hurting yourself in some way -   PHQ 9 Score -   How difficult have these problems made it for you to do your work, take care of your home and get along with others -       History  Past Medical History:   Diagnosis Date    Allergic rhinitis     COVID-19 vaccine series completed     Eczema     GERD (gastroesophageal reflux disease)     Morbid obesity ([facility] [address]     Perennial allergic rhinitis with seasonal variation 5/22/2015    Vertigo        Past Surgical History:   Procedure Laterality Date    HX KNEE ARTHROSCOPY  2009    right knee    HX TONSILLECTOMY         Social History     Socioeconomic History    Marital status: SINGLE     Spouse name: Not on file    Number of children: Not on file    Years of education: Not on file    Highest education level: Not on file   Occupational History    Not on file   Tobacco Use    Smoking status: Former     Types: Cigarettes     Quit date: 10/9/2012     Years since quitting: 10.2    Smokeless tobacco: Never   Substance and Sexual Activity    Alcohol use: No     Alcohol/week: 1.7 standard drinks     Types: 2 Standard drinks or equivalent per week    Drug use: No    Sexual activity: Yes     Partners: Male     Birth control/protection: I.U.D. Comment: removed iud 04/01/2021   Other Topics Concern    Not on file   Social History Narrative    Not on file     Social Determinants of Health     Financial Resource Strain: Not on file   Food Insecurity: Not on file   Transportation Needs: Not on file   Physical Activity: Sufficiently Active    Days of Exercise per Week: 4 days    Minutes of Exercise per Session: 50 min   Stress: Not on file   Social Connections: Not on file   Intimate Partner Violence: Not At Risk    Fear of Current or Ex-Partner: No    Emotionally Abused: No    Physically Abused: No    Sexually Abused: No   Housing Stability: Not on file       Current Outpatient Medications   Medication Sig Dispense Refill    hydrOXYzine HCL (ATARAX) 50 mg tablet Take 1 Tablet by mouth three (3) times daily as needed for Itching. 30 Tablet 1    levocetirizine (XYZAL) 5 mg tablet Take 5 mg by mouth daily. amLODIPine (NORVASC) 2.5 mg tablet       meloxicam (MOBIC) 15 mg tablet Take 1 Tablet by mouth daily.  (Patient not taking: Reported on 12/23/2022) 30 Tablet 2         Vitals:    12/23/22 0702   BP: 129/89   Pulse: 100   Resp: 16   SpO2: 98%   Weight: 321 lb (145.6 kg)   Height: 5' 4\" (1.626 m)   PainSc:   0 - No pain Physical Exam  Vitals and nursing note reviewed. Constitutional:       General: She is not in acute distress. Appearance: Normal appearance. She is obese. She is not ill-appearing, toxic-appearing or diaphoretic. HENT:      Head: Normocephalic and atraumatic. Eyes:      General: No scleral icterus. Extraocular Movements: Extraocular movements intact. Conjunctiva/sclera: Conjunctivae normal.      Pupils: Pupils are equal, round, and reactive to light. Cardiovascular:      Rate and Rhythm: Normal rate and regular rhythm. Pulses: Normal pulses. Heart sounds: No murmur heard. Pulmonary:      Effort: Pulmonary effort is normal. No respiratory distress. Breath sounds: Normal breath sounds. No wheezing or rales. Musculoskeletal:      Cervical back: Normal range of motion. Right lower leg: No edema. Left lower leg: No edema. Skin:     General: Skin is warm and dry. Coloration: Skin is not jaundiced or pale. Neurological:      General: No focal deficit present. Mental Status: She is alert and oriented to person, place, and time. Mental status is at baseline. Cranial Nerves: No cranial nerve deficit. Motor: No weakness. Gait: Gait normal.   Psychiatric:         Mood and Affect: Mood normal.         Behavior: Behavior normal.         Thought Content:  Thought content normal.         Judgment: Judgment normal.       Hospital Outpatient Visit on 12/02/2022   Component Date Value Ref Range Status    Ventricular Rate 12/02/2022 79  BPM Final    Atrial Rate 12/02/2022 79  BPM Final    P-R Interval 12/02/2022 140  ms Final    QRS Duration 12/02/2022 86  ms Final    Q-T Interval 12/02/2022 372  ms Final    QTC Calculation (Bezet) 12/02/2022 426  ms Final    Calculated P Axis 12/02/2022 64  degrees Final    Calculated R Axis 12/02/2022 25  degrees Final    Calculated T Axis 12/02/2022 48  degrees Final    Diagnosis 12/02/2022    Final Value:Normal sinus rhythm  Normal ECG  When compared with ECG of 02-AUG-2021 10:22,  No significant change was found  Confirmed by Esteban Ulloa M.D., Giancarlo De La Cruz. (30) on 12/2/2022 2:26:29 PM     Hospital Outpatient Visit on 11/29/2022   Component Date Value Ref Range Status    H. Pylori Ab,IgA 11/29/2022 10.9 (A)  0.0 - 8.9   Final    TEST PERFORMED AT [place] [id number]. NICOTINE,URINE 11/29/2022 16  ng/mL Final    COTININE,URINE 11/29/2022 127  ng/mL Final    Comment:                      Reference Range:                              Nicotine, Urine                               Smokers: 200-700 ng/mL                            Nonsmokers: < or = 17 ng/mL                              Cotinine, Urine                               Smokers: 300-1300 ng/mL                            Nonsmokers: < or = 20 ng/mL  Individuals exposed to second-hand or passive tobacco  smoke may demonstrate concentrations of nicotine and  cotinine greater than those indicated for non-smokers. This test was developed and its analytical performance  characteristics have been determined by [address] [place] [address] It has  not been cleared or approved by the U.S. Food and Drug  Administration. This assay has been validated pursuant  to the CLIA regulations and is used for clinical  purposes.   Test Performed by [facility], [address] [place] [address] Pascual Jarrett M.D., Ph.D., Director of                            Laboratories  [phone], CLIA [id number]      Potassium 11/29/2022 4.0  3.5 - 5.1 mEq/L Final    Chloride 11/29/2022 102  98 - 107 mEq/L Final    Sodium 11/29/2022 137  136 - 145 mEq/L Final    CO2 11/29/2022 28  20 - 31 mEq/L Final    Glucose 11/29/2022 87  74 - 106 mg/dl Final    BUN 11/29/2022 15  9 - 23 mg/dl Final    Creatinine 11/29/2022 0.71  0.55 - 1.02 mg/dl Final    GFR est AA 11/29/2022 >60.0    Final    Comment: THE [facility] STATES THAT THE MDRD STUDY EQUATION SHOULD ONLY BE USED ON  INDIVIDUALS 18 OR OLDER. THE REPORT ALSO NOTES THAT THE MDRD STUDY EQUATION HAS NOT BEEN  VALIDATED FOR USE WITH THE ELDERLY (OVER 79YEARS OF AGE), PREGNANT WOMEN, PATIENTS WITH SERIOUS  COMORBID CONDITIONS, OR PERSONS WITH EXTREMES OF BODY SIZE, MUSCLE MASS, OR NUTRITIONAL STATUS. APPLICATION OF THE EQUATION TO THESE PATIENT GROUPS MAY LEAD TO ERRORS IN GFR ESTIMATION. GFR  ESTIMATING EQUATIONS HAVE POORER AGREEMENT WITH MEASURED GFR FOR ILL HOSPITALIZED PATIENTS AND  FOR PEOPLE WITH NEAR NORMAL KIDNEY FUNCTION THAN FOR SUBJECTS IN THE MDRD STUDY. VALIDATION  STUDIES ARE IN PROGRESS TO EVALUATE THE MDRD STUDY EQUATION FOR ADDITIONAL ETHNIC GROUPS, THE  ELDERLY, VARIOUS DISEASE CONDITIONS, AND PEOPLE WITH NORMAL KIDNEY FUNCTION. GFRA----REFERS TO   GFRO---REFERS TO OTHER RACES    REFERENCES AVAILABLE UPON REQUEST. GFR est non-AA 11/29/2022 >60    Final    Calcium 11/29/2022 10.2  8.7 - 10.4 mg/dl Final    Anion gap 11/29/2022 7  5 - 15 mmol/L Final    AST (SGOT) 11/29/2022 18.0  0.0 - 33.9 U/L Final    ALT (SGPT) 11/29/2022 13  10 - 49 U/L Final    Alk.  phosphatase 11/29/2022 109  46 - 116 U/L Final    Bilirubin, total 11/29/2022 0.20 (A)  0.30 - 1.20 mg/dl Final    Protein, total 11/29/2022 8.2  5.7 - 8.2 gm/dl Final    Albumin 11/29/2022 3.9  3.4 - 5.0 gm/dl Final    WBC 11/29/2022 8.6  4.0 - 11.0 1000/mm3 Final    RBC 11/29/2022 4.60  3.60 - 5.20 M/uL Final    HGB 11/29/2022 11.1 (A)  13.0 - 17.2 gm/dl Final    HCT 11/29/2022 35.3 (A)  37.0 - 50.0 % Final    MCV 11/29/2022 76.7 (A)  80.0 - 98.0 fL Final    MCH 11/29/2022 24.1 (A)  25.4 - 34.6 pg Final    MCHC 11/29/2022 31.4  30.0 - 36.0 gm/dl Final    PLATELET 01/67/6395 346  140 - 450 1000/mm3 Final    MPV 11/29/2022 10.7 (A)  6.0 - 10.0 fL Final    RDW-SD 11/29/2022 50.0 (A)  36.4 - 46.3   Final    NRBC 11/29/2022 0  0 - 0   Final    IMMATURE GRANULOCYTES 11/29/2022 0.2  0.0 - 3.0 % Final    Comment: IG - Immature granulocytes (promyelocytes + myelocytes + metamyelocytes), their presence  indicates a left shift. An IG > 3% may predict positive blood cultures with 98% specificity.  (P<0.04) and 92% Positive Predictive Value (Hafsa-Melanie)1. Increased immature granulocytes  assist with the detection of infection and/or inflammation and may be present at an early stage  and are more sensitive and specific than band counts. NEUTROPHILS 11/29/2022 67.5 (A)  34 - 64 % Final    LYMPHOCYTES 11/29/2022 21.4 (A)  28 - 48 % Final    MONOCYTES 11/29/2022 9.1  1 - 13 % Final    EOSINOPHILS 11/29/2022 1.5  0 - 5 % Final    BASOPHILS 11/29/2022 0.3  0 - 3 % Final    Ferritin 11/29/2022 7.9  7.3 - 270.7 ng/ml Final    Cholesterol, total 11/29/2022 158  0 - 199 mg/dl Final    Comment: Cholesterol Risk Levels    Low-risk levels (desirable) < 200 mg/dL       Moderate-risk levels (borderline) 200-239 mg/dL  High-risk levels >= 240 mg/dL      HDL Cholesterol 11/29/2022 51  40 - 60 mg/dl Final    Triglyceride 11/29/2022 82  0 - 150 mg/dl Final    LDL, calculated 11/29/2022 91  0 - 130 mg/dl Final    Comment: TARGET/DECISION VALUES DEPEND ON A NUMBER OF RISK FACTORS. LDL CALCULATION NOT VALID IF TRIGLYCERIDES ARE GREATER THAN 400 mg/dL. CHOL/HDL Ratio 11/29/2022 3.1  0.0 - 4.4 Ratio Final    Vitamin B1, plasma 11/29/2022 9  8 - 30 nmol/L Final    Comment: Vitamin supplementation within 24 hours prior to  blood draw may affect the accuracy of the results. This test was developed and its analytical performance  characteristics have been determined by [address] [place] [address]. It has  not been cleared or approved by the U.S. Food and Drug  Administration. This assay has been validated pursuant  to the CLIA regulations and is used for clinical  purposes.   Test Performed by Shar[facility] Mensah [place] [address], [address] [address], [address] Pascual Barreto M.D., Ph.D., Director of Laboratories  [phone], [place] [id number]      Vitamin B12 11/29/2022 685  211 - 911 pg/ml Final    H. pylori Ab, IgG 11/29/2022 0.58  0.00 - 0.74   Final    Comment: Reference Ranges  Negative     <0.75           No detectable IgG Antibody  Equivocal    0.75-1.00     Equivocal for IgG Antibodies  Positive      =>1.10         H. pylori Antibody detected      Hemoglobin A1c 11/29/2022 5.6  3.8 - 5.6 % Final    Comment:   DIABETIC = GREATER THAN OR EQUALS TO 6.5%    PREDIABETIC = 5.7 - 6.4%                      IF A1C % IS:               YOUR ESTIMATED AVERAGE GLUCOSE IS:    4.0                        68 mg/dL    4.5                        82 mg/dL    5.0                        97 mg/dL    5.5                        111 mg/dL    6                          126 mg/dL    6.5                        140 mg/dL    7                          154 mg/dL    7.5                        169 mg/dL    8                          183 mg/dL    8.5                        197 mg/dL    9                          212 mg/dL    9.5                        226 mg/dL    10                         240 mg/dL    10.5                       255 mg/dL    11                         269 mg/dL    11.5                       283 mg/dL    12                         298 mg/dL      TSH 11/29/2022 0.692  0.550 - 4.780 uIU/mL Final       No results found for any visits on 12/23/22. Patient Care Team:  Patient Care Team:  Jeancarlos Barkley MD as PCP - General (Internal Medicine Physician)  Jeancarlos Barkley MD as PCP - [facility]  Michelle Martinez MD (General Surgery)      Assessment / Plan:      ICD-10-CM ICD-9-CM    1. Iron deficiency anemia due to chronic blood loss  D50.0 280.0 CBC WITH AUTOMATED DIFF      FERRITIN      IRON PROFILE      2. Rash and nonspecific skin eruption  R21 782.1 hydrOXYzine HCL (ATARAX) 50 mg tablet      3.  Essential hypertension  I10 401.9       4. Class 3 severe obesity with serious comorbidity and body mass index (BMI) of 50.0 to 59.9 in adult, unspecified obesity type (HCC)  E66.01 278.01     Z68.43 V85.43       5. Sinus congestion  R09.81 478.19         Patient requesting medical clearance for bariatric surgery-form completed. HTN: BP acceptable on amlodipine. Continue    Anemia: Continue iron supplements. HGB improved to 11.1, ferritin level still low. Also noted to have mild lymphopenia. Repeat CBC, iron profile and ferritin. Depression: No significant depressive symptoms at this time. Continue to monitor. Rash with itching:  Continue Hydroxyzine as needed. Sinus congestion: Discussed that this is likely a viral URI. Continue OTC agents and Tylenol as needed. Follow-up and Dispositions    Return in about 4 months (around 4/23/2023). I asked the patient if she  had any questions and answered her  questions. The patient stated that she understands the treatment plan and agrees with the treatment plan    This document was created with a voice activated dictation system and may contain transcription errors.

## 2023-04-25 SDOH — ECONOMIC STABILITY: INCOME INSECURITY: HOW HARD IS IT FOR YOU TO PAY FOR THE VERY BASICS LIKE FOOD, HOUSING, MEDICAL CARE, AND HEATING?: NOT VERY HARD

## 2023-04-25 SDOH — ECONOMIC STABILITY: TRANSPORTATION INSECURITY
IN THE PAST 12 MONTHS, HAS LACK OF TRANSPORTATION KEPT YOU FROM MEETINGS, WORK, OR FROM GETTING THINGS NEEDED FOR DAILY LIVING?: NO

## 2023-04-25 SDOH — ECONOMIC STABILITY: HOUSING INSECURITY
IN THE LAST 12 MONTHS, WAS THERE A TIME WHEN YOU DID NOT HAVE A STEADY PLACE TO SLEEP OR SLEPT IN A SHELTER (INCLUDING NOW)?: NO

## 2023-04-25 SDOH — ECONOMIC STABILITY: FOOD INSECURITY: WITHIN THE PAST 12 MONTHS, THE FOOD YOU BOUGHT JUST DIDN'T LAST AND YOU DIDN'T HAVE MONEY TO GET MORE.: SOMETIMES TRUE

## 2023-04-25 SDOH — ECONOMIC STABILITY: FOOD INSECURITY: WITHIN THE PAST 12 MONTHS, YOU WORRIED THAT YOUR FOOD WOULD RUN OUT BEFORE YOU GOT MONEY TO BUY MORE.: SOMETIMES TRUE

## 2023-05-02 SDOH — ECONOMIC STABILITY: FOOD INSECURITY: WITHIN THE PAST 12 MONTHS, THE FOOD YOU BOUGHT JUST DIDN'T LAST AND YOU DIDN'T HAVE MONEY TO GET MORE.: SOMETIMES TRUE

## 2023-05-02 SDOH — ECONOMIC STABILITY: INCOME INSECURITY: HOW HARD IS IT FOR YOU TO PAY FOR THE VERY BASICS LIKE FOOD, HOUSING, MEDICAL CARE, AND HEATING?: NOT VERY HARD

## 2023-05-02 SDOH — ECONOMIC STABILITY: FOOD INSECURITY: WITHIN THE PAST 12 MONTHS, YOU WORRIED THAT YOUR FOOD WOULD RUN OUT BEFORE YOU GOT MONEY TO BUY MORE.: SOMETIMES TRUE

## 2023-05-05 ENCOUNTER — OFFICE VISIT (OUTPATIENT)
Facility: CLINIC | Age: 43
End: 2023-05-05
Payer: MEDICAID

## 2023-05-05 VITALS
SYSTOLIC BLOOD PRESSURE: 135 MMHG | OXYGEN SATURATION: 97 % | HEART RATE: 83 BPM | WEIGHT: 293 LBS | BODY MASS INDEX: 54.58 KG/M2 | DIASTOLIC BLOOD PRESSURE: 85 MMHG

## 2023-05-05 DIAGNOSIS — R21 RASH AND OTHER NONSPECIFIC SKIN ERUPTION: ICD-10-CM

## 2023-05-05 DIAGNOSIS — E66.01 MORBID (SEVERE) OBESITY DUE TO EXCESS CALORIES (HCC): ICD-10-CM

## 2023-05-05 DIAGNOSIS — D50.0 IRON DEFICIENCY ANEMIA SECONDARY TO BLOOD LOSS (CHRONIC): Primary | ICD-10-CM

## 2023-05-05 DIAGNOSIS — I10 ESSENTIAL (PRIMARY) HYPERTENSION: ICD-10-CM

## 2023-05-05 PROCEDURE — 99214 OFFICE O/P EST MOD 30 MIN: CPT | Performed by: STUDENT IN AN ORGANIZED HEALTH CARE EDUCATION/TRAINING PROGRAM

## 2023-05-05 PROCEDURE — 3079F DIAST BP 80-89 MM HG: CPT | Performed by: STUDENT IN AN ORGANIZED HEALTH CARE EDUCATION/TRAINING PROGRAM

## 2023-05-05 PROCEDURE — 3075F SYST BP GE 130 - 139MM HG: CPT | Performed by: STUDENT IN AN ORGANIZED HEALTH CARE EDUCATION/TRAINING PROGRAM

## 2023-05-05 RX ORDER — HYDROXYZINE 50 MG/1
50 TABLET, FILM COATED ORAL EVERY 8 HOURS PRN
Qty: 30 TABLET | Refills: 1 | Status: SHIPPED | OUTPATIENT
Start: 2023-05-05

## 2023-05-05 ASSESSMENT — ENCOUNTER SYMPTOMS
ABDOMINAL PAIN: 0
VOMITING: 0
NAUSEA: 0
RHINORRHEA: 0
BACK PAIN: 0
CHEST TIGHTNESS: 0
BLOOD IN STOOL: 0
DIARRHEA: 0
SHORTNESS OF BREATH: 0
WHEEZING: 0
COUGH: 0

## 2023-05-05 NOTE — PROGRESS NOTES
MCH 04/18/2023 24 (L)  26 - 34 pg Final    MCHC 04/18/2023 28 (L)  31 - 36 g/dL Final    RDW 04/18/2023 19.5 (H)  10.0 - 15.5 % Final    Platelets 59/20/0776 393  140 - 440 K/uL Final    MPV 04/18/2023 11.5  9.0 - 13.0 fL Final    Neutrophils 04/18/2023 62  40 - 75 % Final    Lymphocytes 04/18/2023 27  20 - 45 % Final    Monocytes 04/18/2023 9  3 - 12 % Final    Eosinophils 04/18/2023 1  0 - 6 % Final    Basophils 04/18/2023 0  0 - 2 % Final    Neutrophils Absolute 04/18/2023 4.9  1.8 - 7.7 K/uL Final    Lymphocytes Absolute 04/18/2023 2.1  1.0 - 4.8 K/uL Final    Monocytes Absolute 04/18/2023 0.7  0.1 - 1.0 K/uL Final    Eosinophils Absolute 04/18/2023 0.1  0.0 - 0.5 K/uL Final    Basophils Absolute 04/18/2023 0.0  0.0 - 0.2 K/uL Final    Iron 04/18/2023 26 (L)  30 - 160 mcg/dL Final    UIBC 04/18/2023 304  110 - 370 mcg/dL Final    TIBC 04/18/2023 330  228 - 428 mcg/dL Final    Iron % Saturation 04/18/2023 8 (L)  20 - 50 % Final       No results found for any visits on 05/05/23. Patient Care Team:  Patient Care Team:  Huyen York MD as PCP - Ralph Ac MD as PCP - [facility] [place] [facility]      Assessment / Plan:     Diagnosis Orders   1. Iron deficiency anemia secondary to blood loss (chronic)        2. Rash and other nonspecific skin eruption  hydrOXYzine HCl (ATARAX) 50 MG tablet      3. Essential (primary) hypertension        4. Morbid (severe) obesity due to excess calories [facility])            Patient is medically cleared for bariatric surgery. Labs reviewed and discussed with patient. HTN: BP acceptable on amlodipine. Continue     Anemia: Patient has been taking iron supplements daily but continues to have hemoglobin in the 10s. She is also scheduled for surgery in a few weeks. Will try and schedule her for an iron infusion prior to the surgery. Depression: No significant depressive symptoms at this time. Continue to monitor.      Rash with itching:  Refill hydroxyzine      Return

## 2023-05-15 PROBLEM — E66.01 SEVERE OBESITY (HCC): Status: ACTIVE | Noted: 2023-05-15

## 2023-05-16 PROBLEM — F41.8 MIXED ANXIETY AND DEPRESSIVE DISORDER: Status: ACTIVE | Noted: 2022-12-04

## 2023-05-16 PROBLEM — M19.90 OSTEOARTHROSIS: Status: ACTIVE | Noted: 2022-12-04

## 2023-05-16 PROBLEM — D50.0 IRON DEFICIENCY ANEMIA DUE TO CHRONIC BLOOD LOSS: Status: ACTIVE | Noted: 2022-05-13

## 2023-05-16 PROBLEM — I10 ESSENTIAL HYPERTENSION: Status: ACTIVE | Noted: 2022-12-23

## 2023-05-16 PROBLEM — M15.9 GENERALIZED OSTEOARTHRITIS: Status: ACTIVE | Noted: 2022-12-04

## 2023-05-16 PROBLEM — I10 HYPERTENSIVE DISORDER: Status: ACTIVE | Noted: 2022-11-29

## 2023-05-16 PROBLEM — R16.0 HEPATOMEGALY: Status: ACTIVE | Noted: 2022-11-29

## 2023-06-02 ENCOUNTER — OFFICE VISIT (OUTPATIENT)
Facility: CLINIC | Age: 43
End: 2023-06-02

## 2023-06-02 VITALS
TEMPERATURE: 97 F | DIASTOLIC BLOOD PRESSURE: 73 MMHG | HEIGHT: 64 IN | BODY MASS INDEX: 50.02 KG/M2 | RESPIRATION RATE: 16 BRPM | OXYGEN SATURATION: 100 % | SYSTOLIC BLOOD PRESSURE: 102 MMHG | HEART RATE: 74 BPM | WEIGHT: 293 LBS

## 2023-06-02 DIAGNOSIS — E66.01 MORBID (SEVERE) OBESITY DUE TO EXCESS CALORIES (HCC): ICD-10-CM

## 2023-06-02 DIAGNOSIS — D50.0 IRON DEFICIENCY ANEMIA SECONDARY TO BLOOD LOSS (CHRONIC): ICD-10-CM

## 2023-06-02 DIAGNOSIS — K59.00 CONSTIPATION, UNSPECIFIED CONSTIPATION TYPE: ICD-10-CM

## 2023-06-02 DIAGNOSIS — I10 ESSENTIAL (PRIMARY) HYPERTENSION: Primary | ICD-10-CM

## 2023-06-02 DIAGNOSIS — Z98.84 STATUS POST BARIATRIC SURGERY: ICD-10-CM

## 2023-06-02 DIAGNOSIS — R21 RASH AND OTHER NONSPECIFIC SKIN ERUPTION: ICD-10-CM

## 2023-06-02 RX ORDER — FERROUS SULFATE 325(65) MG
325 TABLET ORAL
COMMUNITY

## 2023-06-02 RX ORDER — DOCUSATE SODIUM 100 MG/1
100 CAPSULE, LIQUID FILLED ORAL 2 TIMES DAILY
COMMUNITY

## 2023-06-02 RX ORDER — POLYETHYLENE GLYCOL 3350 17 G/17G
17 POWDER, FOR SOLUTION ORAL DAILY
Qty: 1530 G | Refills: 1 | Status: SHIPPED | OUTPATIENT
Start: 2023-06-02 | End: 2023-11-29

## 2023-06-02 SDOH — ECONOMIC STABILITY: INCOME INSECURITY: HOW HARD IS IT FOR YOU TO PAY FOR THE VERY BASICS LIKE FOOD, HOUSING, MEDICAL CARE, AND HEATING?: NOT HARD AT ALL

## 2023-06-02 SDOH — ECONOMIC STABILITY: FOOD INSECURITY: WITHIN THE PAST 12 MONTHS, YOU WORRIED THAT YOUR FOOD WOULD RUN OUT BEFORE YOU GOT MONEY TO BUY MORE.: NEVER TRUE

## 2023-06-02 SDOH — ECONOMIC STABILITY: FOOD INSECURITY: WITHIN THE PAST 12 MONTHS, THE FOOD YOU BOUGHT JUST DIDN'T LAST AND YOU DIDN'T HAVE MONEY TO GET MORE.: NEVER TRUE

## 2023-06-02 ASSESSMENT — PATIENT HEALTH QUESTIONNAIRE - PHQ9
SUM OF ALL RESPONSES TO PHQ QUESTIONS 1-9: 0
5. POOR APPETITE OR OVEREATING: 0
SUM OF ALL RESPONSES TO PHQ QUESTIONS 1-9: 0
1. LITTLE INTEREST OR PLEASURE IN DOING THINGS: 0
7. TROUBLE CONCENTRATING ON THINGS, SUCH AS READING THE NEWSPAPER OR WATCHING TELEVISION: 0
2. FEELING DOWN, DEPRESSED OR HOPELESS: 0
10. IF YOU CHECKED OFF ANY PROBLEMS, HOW DIFFICULT HAVE THESE PROBLEMS MADE IT FOR YOU TO DO YOUR WORK, TAKE CARE OF THINGS AT HOME, OR GET ALONG WITH OTHER PEOPLE: 0
SUM OF ALL RESPONSES TO PHQ QUESTIONS 1-9: 0
8. MOVING OR SPEAKING SO SLOWLY THAT OTHER PEOPLE COULD HAVE NOTICED. OR THE OPPOSITE, BEING SO FIGETY OR RESTLESS THAT YOU HAVE BEEN MOVING AROUND A LOT MORE THAN USUAL: 0
SUM OF ALL RESPONSES TO PHQ9 QUESTIONS 1 & 2: 0
SUM OF ALL RESPONSES TO PHQ QUESTIONS 1-9: 0
6. FEELING BAD ABOUT YOURSELF - OR THAT YOU ARE A FAILURE OR HAVE LET YOURSELF OR YOUR FAMILY DOWN: 0
3. TROUBLE FALLING OR STAYING ASLEEP: 0
4. FEELING TIRED OR HAVING LITTLE ENERGY: 0
9. THOUGHTS THAT YOU WOULD BE BETTER OFF DEAD, OR OF HURTING YOURSELF: 0

## 2023-06-02 ASSESSMENT — ENCOUNTER SYMPTOMS
ABDOMINAL PAIN: 0
DIARRHEA: 0
COUGH: 0
BLOOD IN STOOL: 0
NAUSEA: 0
RHINORRHEA: 0
SHORTNESS OF BREATH: 0
WHEEZING: 0
CHEST TIGHTNESS: 0
VOMITING: 0
BACK PAIN: 0

## 2023-06-02 NOTE — PROGRESS NOTES
questions. The patient stated that she understands the treatment plan and agrees with the treatment plan    This document was created with a voice activated dictation system and may contain transcription errors.

## 2023-10-02 ENCOUNTER — HOSPITAL ENCOUNTER (OUTPATIENT)
Facility: HOSPITAL | Age: 43
Setting detail: SPECIMEN
Discharge: HOME | End: 2023-10-05

## 2023-10-02 LAB — SENTARA SPECIMEN COLLECTION: NORMAL

## 2023-10-03 LAB
ANION GAP SERPL CALCULATED.3IONS-SCNC: 10 MMOL/L (ref 3–15)
BASOPHILS # BLD: 1 % (ref 0–2)
BASOPHILS ABSOLUTE: 0 K/UL (ref 0–0.2)
BUN BLDV-MCNC: 9 MG/DL (ref 6–22)
CALCIUM SERPL-MCNC: 9.3 MG/DL (ref 8.4–10.5)
CHLORIDE BLD-SCNC: 106 MMOL/L (ref 98–110)
CO2: 28 MMOL/L (ref 20–32)
CREAT SERPL-MCNC: 0.6 MG/DL (ref 0.5–1.2)
EOSINOPHIL # BLD: 1 % (ref 0–6)
EOSINOPHILS ABSOLUTE: 0.1 K/UL (ref 0–0.5)
FERRITIN: 13 NG/ML (ref 10–291)
FOLATE: >20 NG/ML
GLOMERULAR FILTRATION RATE: >60 ML/MIN/1.73 SQ.M.
GLUCOSE: 82 MG/DL (ref 70–99)
HCT VFR BLD CALC: 36.4 % (ref 35.1–46.5)
HEMOGLOBIN: 10.7 G/DL (ref 11.7–15.5)
IRON % SATURATION: 15 % (ref 20–50)
IRON: 45 MCG/DL (ref 30–160)
LYMPHOCYTES # BLD: 30 % (ref 20–45)
LYMPHOCYTES ABSOLUTE: 1.9 K/UL (ref 1–4.8)
MCH RBC QN AUTO: 25 PG (ref 26–34)
MCHC RBC AUTO-ENTMCNC: 29 G/DL (ref 31–36)
MCV RBC AUTO: 86 FL (ref 80–99)
MONOCYTES ABSOLUTE: 0.6 K/UL (ref 0.1–1)
MONOCYTES: 10 % (ref 3–12)
NEUTROPHILS ABSOLUTE: 3.7 K/UL (ref 1.8–7.7)
NEUTROPHILS: 58 % (ref 40–75)
PDW BLD-RTO: 19 % (ref 10–15.5)
PLATELET # BLD: 313 K/UL (ref 140–440)
PMV BLD AUTO: 12.3 FL (ref 9–13)
POTASSIUM SERPL-SCNC: 4.1 MMOL/L (ref 3.5–5.5)
RBC: 4.24 M/UL (ref 3.8–5.2)
SODIUM BLD-SCNC: 144 MMOL/L (ref 133–145)
TOTAL IRON BINDING CAPACITY: 310 MCG/DL (ref 228–428)
UIBC: 265 MCG/DL (ref 110–370)
VITAMIN B-12: 564 PG/ML (ref 211–911)
VITAMIN D 25-HYDROXY: 30.6 NG/ML (ref 32–100)
WBC: 6.3 K/UL (ref 4–11)

## 2023-10-06 ENCOUNTER — OFFICE VISIT (OUTPATIENT)
Facility: CLINIC | Age: 43
End: 2023-10-06
Payer: COMMERCIAL

## 2023-10-06 VITALS
SYSTOLIC BLOOD PRESSURE: 132 MMHG | HEIGHT: 64 IN | WEIGHT: 257 LBS | RESPIRATION RATE: 16 BRPM | DIASTOLIC BLOOD PRESSURE: 82 MMHG | HEART RATE: 70 BPM | OXYGEN SATURATION: 97 % | BODY MASS INDEX: 43.87 KG/M2 | TEMPERATURE: 97.8 F

## 2023-10-06 DIAGNOSIS — R53.82 CHRONIC FATIGUE: ICD-10-CM

## 2023-10-06 DIAGNOSIS — Z23 ENCOUNTER FOR IMMUNIZATION: ICD-10-CM

## 2023-10-06 DIAGNOSIS — E66.01 CLASS 3 SEVERE OBESITY WITH SERIOUS COMORBIDITY AND BODY MASS INDEX (BMI) OF 40.0 TO 44.9 IN ADULT, UNSPECIFIED OBESITY TYPE (HCC): ICD-10-CM

## 2023-10-06 DIAGNOSIS — I10 ESSENTIAL (PRIMARY) HYPERTENSION: ICD-10-CM

## 2023-10-06 DIAGNOSIS — R21 RASH AND OTHER NONSPECIFIC SKIN ERUPTION: ICD-10-CM

## 2023-10-06 DIAGNOSIS — G25.81 RESTLESS LEGS: ICD-10-CM

## 2023-10-06 DIAGNOSIS — R79.89 ABNORMAL THYROID BLOOD TEST: ICD-10-CM

## 2023-10-06 DIAGNOSIS — D50.0 IRON DEFICIENCY ANEMIA SECONDARY TO BLOOD LOSS (CHRONIC): Primary | ICD-10-CM

## 2023-10-06 DIAGNOSIS — L65.9 HAIR LOSS: ICD-10-CM

## 2023-10-06 DIAGNOSIS — Z98.84 STATUS POST BARIATRIC SURGERY: ICD-10-CM

## 2023-10-06 PROCEDURE — 3075F SYST BP GE 130 - 139MM HG: CPT | Performed by: STUDENT IN AN ORGANIZED HEALTH CARE EDUCATION/TRAINING PROGRAM

## 2023-10-06 PROCEDURE — 90674 CCIIV4 VAC NO PRSV 0.5 ML IM: CPT | Performed by: STUDENT IN AN ORGANIZED HEALTH CARE EDUCATION/TRAINING PROGRAM

## 2023-10-06 PROCEDURE — 90471 IMMUNIZATION ADMIN: CPT | Performed by: STUDENT IN AN ORGANIZED HEALTH CARE EDUCATION/TRAINING PROGRAM

## 2023-10-06 PROCEDURE — 3079F DIAST BP 80-89 MM HG: CPT | Performed by: STUDENT IN AN ORGANIZED HEALTH CARE EDUCATION/TRAINING PROGRAM

## 2023-10-06 PROCEDURE — 99214 OFFICE O/P EST MOD 30 MIN: CPT | Performed by: STUDENT IN AN ORGANIZED HEALTH CARE EDUCATION/TRAINING PROGRAM

## 2023-10-06 RX ORDER — NYSTATIN 10B UNIT
POWDER (EA) MISCELLANEOUS 2 TIMES DAILY
COMMUNITY

## 2023-10-06 RX ORDER — HYDROXYZINE 50 MG/1
50 TABLET, FILM COATED ORAL EVERY 8 HOURS PRN
Qty: 30 TABLET | Refills: 1 | Status: SHIPPED | OUTPATIENT
Start: 2023-10-06

## 2023-10-06 SDOH — ECONOMIC STABILITY: FOOD INSECURITY: WITHIN THE PAST 12 MONTHS, THE FOOD YOU BOUGHT JUST DIDN'T LAST AND YOU DIDN'T HAVE MONEY TO GET MORE.: NEVER TRUE

## 2023-10-06 SDOH — ECONOMIC STABILITY: INCOME INSECURITY: HOW HARD IS IT FOR YOU TO PAY FOR THE VERY BASICS LIKE FOOD, HOUSING, MEDICAL CARE, AND HEATING?: NOT HARD AT ALL

## 2023-10-06 SDOH — ECONOMIC STABILITY: FOOD INSECURITY: WITHIN THE PAST 12 MONTHS, YOU WORRIED THAT YOUR FOOD WOULD RUN OUT BEFORE YOU GOT MONEY TO BUY MORE.: NEVER TRUE

## 2023-10-06 ASSESSMENT — ENCOUNTER SYMPTOMS
VOMITING: 0
BLOOD IN STOOL: 0
DIARRHEA: 0
NAUSEA: 0
RHINORRHEA: 0
BACK PAIN: 0
CHEST TIGHTNESS: 0
ROS SKIN COMMENTS: HAIR LOSS
COUGH: 0
ABDOMINAL PAIN: 0
SHORTNESS OF BREATH: 0
WHEEZING: 0

## 2023-10-06 ASSESSMENT — ANXIETY QUESTIONNAIRES
6. BECOMING EASILY ANNOYED OR IRRITABLE: 0
5. BEING SO RESTLESS THAT IT IS HARD TO SIT STILL: 0
7. FEELING AFRAID AS IF SOMETHING AWFUL MIGHT HAPPEN: 0
3. WORRYING TOO MUCH ABOUT DIFFERENT THINGS: 0
IF YOU CHECKED OFF ANY PROBLEMS ON THIS QUESTIONNAIRE, HOW DIFFICULT HAVE THESE PROBLEMS MADE IT FOR YOU TO DO YOUR WORK, TAKE CARE OF THINGS AT HOME, OR GET ALONG WITH OTHER PEOPLE: NOT DIFFICULT AT ALL
2. NOT BEING ABLE TO STOP OR CONTROL WORRYING: 0
GAD7 TOTAL SCORE: 0
4. TROUBLE RELAXING: 0
1. FEELING NERVOUS, ANXIOUS, OR ON EDGE: 0

## 2023-10-06 ASSESSMENT — PATIENT HEALTH QUESTIONNAIRE - PHQ9
8. MOVING OR SPEAKING SO SLOWLY THAT OTHER PEOPLE COULD HAVE NOTICED. OR THE OPPOSITE, BEING SO FIGETY OR RESTLESS THAT YOU HAVE BEEN MOVING AROUND A LOT MORE THAN USUAL: 0
10. IF YOU CHECKED OFF ANY PROBLEMS, HOW DIFFICULT HAVE THESE PROBLEMS MADE IT FOR YOU TO DO YOUR WORK, TAKE CARE OF THINGS AT HOME, OR GET ALONG WITH OTHER PEOPLE: 0
6. FEELING BAD ABOUT YOURSELF - OR THAT YOU ARE A FAILURE OR HAVE LET YOURSELF OR YOUR FAMILY DOWN: 0
SUM OF ALL RESPONSES TO PHQ QUESTIONS 1-9: 0
1. LITTLE INTEREST OR PLEASURE IN DOING THINGS: 0
SUM OF ALL RESPONSES TO PHQ QUESTIONS 1-9: 0
SUM OF ALL RESPONSES TO PHQ QUESTIONS 1-9: 0
9. THOUGHTS THAT YOU WOULD BE BETTER OFF DEAD, OR OF HURTING YOURSELF: 0
5. POOR APPETITE OR OVEREATING: 0
SUM OF ALL RESPONSES TO PHQ9 QUESTIONS 1 & 2: 0
4. FEELING TIRED OR HAVING LITTLE ENERGY: 0
3. TROUBLE FALLING OR STAYING ASLEEP: 0
SUM OF ALL RESPONSES TO PHQ QUESTIONS 1-9: 0
2. FEELING DOWN, DEPRESSED OR HOPELESS: 0
7. TROUBLE CONCENTRATING ON THINGS, SUCH AS READING THE NEWSPAPER OR WATCHING TELEVISION: 0

## 2023-10-06 NOTE — PROGRESS NOTES
5. Chronic fatigue        6. Hair loss        7. Restless legs        8. Class 3 severe obesity with serious comorbidity and body mass index (BMI) of 40.0 to 44.9 in adult, unspecified obesity type ([address])        9. Abnormal thyroid blood test  TSH + Free T4 Panel    Thyroid Antibodies      10. Encounter for immunization  Influenza, FLUCELVAX, (age 10 mo+), IM, PF, 0.5 mL        Labs reviewed and discussed with patient. HTN: Patient is no longer on the amlodipine. BP is acceptable. Abnormal TSH: Repeat labs ordered with thyroid antibodies. Check T4     Anemia: Patient continues to have low iron levels spite oral iron supplementation. Hemoglobin is 10.7. Patient is complaining of fatigue, hair loss, restless legs. Will prescribe IV iron infusion. .     Depression: No significant depressive symptoms at this time. Continue to monitor. Rash with itching: Continue hydroxyzine as needed. She had intertrigo and was prescribed nystatin powder with improvement. Obesity: Patient is s/p Salma-en-Y Bypass. Weight on the day of surgery was 309 lbs. Today, patient is 257 lbs. Constipation: Improved. Return in about 4 months (around 2/6/2024). I asked the patient if she  had any questions and answered her  questions. The patient stated that she understands the treatment plan and agrees with the treatment plan    This document was created with a voice activated dictation system and may contain transcription errors.

## 2023-12-04 ENCOUNTER — TELEPHONE (OUTPATIENT)
Facility: HOSPITAL | Age: 43
End: 2023-12-04

## 2023-12-04 NOTE — TELEPHONE ENCOUNTER
Ms Richardson called us to cancel her appointment for 12/11/23 to have her first Iron (Venofer) infusion.  She stated she did not want to reschedule because she \"can't afford the copay\".  I told her we have a  that could possibly help her with copay assistance but, she said she didn't want me to contact her.

## 2024-02-05 ENCOUNTER — HOSPITAL ENCOUNTER (OUTPATIENT)
Facility: HOSPITAL | Age: 44
Setting detail: SPECIMEN
Discharge: HOME | End: 2024-02-08
Payer: COMMERCIAL

## 2024-02-05 DIAGNOSIS — R79.89 ABNORMAL THYROID BLOOD TEST: ICD-10-CM

## 2024-02-05 DIAGNOSIS — D50.0 IRON DEFICIENCY ANEMIA SECONDARY TO BLOOD LOSS (CHRONIC): ICD-10-CM

## 2024-02-05 LAB
BASOPHILS # BLD: 0 K/UL (ref 0–0.1)
BASOPHILS NFR BLD: 0 % (ref 0–2)
DIFFERENTIAL METHOD BLD: ABNORMAL
EOSINOPHIL # BLD: 0.1 K/UL (ref 0–0.4)
EOSINOPHIL NFR BLD: 2 % (ref 0–5)
ERYTHROCYTE [DISTWIDTH] IN BLOOD BY AUTOMATED COUNT: 17.2 % (ref 11.6–14.5)
FERRITIN SERPL-MCNC: 6 NG/ML (ref 8–388)
HCT VFR BLD AUTO: 30.7 % (ref 35–45)
HGB BLD-MCNC: 9.6 G/DL (ref 12–16)
IMM GRANULOCYTES # BLD AUTO: 0 K/UL (ref 0–0.04)
IMM GRANULOCYTES NFR BLD AUTO: 0 % (ref 0–0.5)
IRON SATN MFR SERPL: 5 % (ref 20–50)
IRON SERPL-MCNC: 15 UG/DL (ref 50–175)
LYMPHOCYTES # BLD: 2.1 K/UL (ref 0.9–3.6)
LYMPHOCYTES NFR BLD: 30 % (ref 21–52)
MCH RBC QN AUTO: 26.6 PG (ref 24–34)
MCHC RBC AUTO-ENTMCNC: 31.3 G/DL (ref 31–37)
MCV RBC AUTO: 85 FL (ref 78–100)
MONOCYTES # BLD: 0.7 K/UL (ref 0.05–1.2)
MONOCYTES NFR BLD: 10 % (ref 3–10)
NEUTS SEG # BLD: 4 K/UL (ref 1.8–8)
NEUTS SEG NFR BLD: 57 % (ref 40–73)
NRBC # BLD: 0 K/UL (ref 0–0.01)
NRBC BLD-RTO: 0 PER 100 WBC
PLATELET # BLD AUTO: 340 K/UL (ref 135–420)
PMV BLD AUTO: 11.4 FL (ref 9.2–11.8)
RBC # BLD AUTO: 3.61 M/UL (ref 4.2–5.3)
T4 FREE SERPL-MCNC: 1.1 NG/DL (ref 0.7–1.5)
TIBC SERPL-MCNC: 331 UG/DL (ref 250–450)
TSH SERPL DL<=0.05 MIU/L-ACNC: 1.09 UIU/ML (ref 0.36–3.74)
WBC # BLD AUTO: 7.1 K/UL (ref 4.6–13.2)

## 2024-02-05 PROCEDURE — 36415 COLL VENOUS BLD VENIPUNCTURE: CPT

## 2024-02-05 PROCEDURE — 82728 ASSAY OF FERRITIN: CPT

## 2024-02-05 PROCEDURE — 84439 ASSAY OF FREE THYROXINE: CPT

## 2024-02-05 PROCEDURE — 83540 ASSAY OF IRON: CPT

## 2024-02-05 PROCEDURE — 83550 IRON BINDING TEST: CPT

## 2024-02-05 PROCEDURE — 85025 COMPLETE CBC W/AUTO DIFF WBC: CPT

## 2024-02-05 PROCEDURE — 84443 ASSAY THYROID STIM HORMONE: CPT

## 2024-02-05 PROCEDURE — 86800 THYROGLOBULIN ANTIBODY: CPT

## 2024-02-05 PROCEDURE — 86376 MICROSOMAL ANTIBODY EACH: CPT

## 2024-02-06 LAB
THYROGLOB AB SERPL-ACNC: <1 IU/ML (ref 0–0.9)
THYROPEROXIDASE AB SERPL-ACNC: <9 IU/ML (ref 0–34)

## 2024-02-09 ENCOUNTER — OFFICE VISIT (OUTPATIENT)
Facility: CLINIC | Age: 44
End: 2024-02-09
Payer: COMMERCIAL

## 2024-02-09 VITALS
TEMPERATURE: 98.2 F | HEIGHT: 64 IN | SYSTOLIC BLOOD PRESSURE: 122 MMHG | WEIGHT: 229 LBS | BODY MASS INDEX: 39.09 KG/M2 | RESPIRATION RATE: 16 BRPM | HEART RATE: 71 BPM | DIASTOLIC BLOOD PRESSURE: 82 MMHG | OXYGEN SATURATION: 98 %

## 2024-02-09 DIAGNOSIS — D50.0 IRON DEFICIENCY ANEMIA SECONDARY TO BLOOD LOSS (CHRONIC): ICD-10-CM

## 2024-02-09 DIAGNOSIS — I10 ESSENTIAL (PRIMARY) HYPERTENSION: ICD-10-CM

## 2024-02-09 DIAGNOSIS — E66.01 CLASS 2 SEVERE OBESITY WITH SERIOUS COMORBIDITY AND BODY MASS INDEX (BMI) OF 39.0 TO 39.9 IN ADULT, UNSPECIFIED OBESITY TYPE (HCC): ICD-10-CM

## 2024-02-09 DIAGNOSIS — E55.9 VITAMIN D DEFICIENCY: ICD-10-CM

## 2024-02-09 DIAGNOSIS — R30.0 DYSURIA: ICD-10-CM

## 2024-02-09 DIAGNOSIS — Z98.84 STATUS POST BARIATRIC SURGERY: ICD-10-CM

## 2024-02-09 DIAGNOSIS — N30.01 ACUTE CYSTITIS WITH HEMATURIA: Primary | ICD-10-CM

## 2024-02-09 LAB
BILIRUBIN, URINE, POC: NORMAL
BLOOD URINE, POC: NORMAL
GLUCOSE URINE, POC: NEGATIVE
KETONES, URINE, POC: NORMAL
LEUKOCYTE ESTERASE, URINE, POC: NORMAL
NITRITE, URINE, POC: NEGATIVE
PH, URINE, POC: 5.5 (ref 4.6–8)
PROTEIN,URINE, POC: NORMAL
SPECIFIC GRAVITY, URINE, POC: 1.03 (ref 1–1.03)
URINALYSIS CLARITY, POC: CLEAR
URINALYSIS COLOR, POC: YELLOW
UROBILINOGEN, POC: NORMAL

## 2024-02-09 PROCEDURE — 99214 OFFICE O/P EST MOD 30 MIN: CPT | Performed by: STUDENT IN AN ORGANIZED HEALTH CARE EDUCATION/TRAINING PROGRAM

## 2024-02-09 PROCEDURE — 3079F DIAST BP 80-89 MM HG: CPT | Performed by: STUDENT IN AN ORGANIZED HEALTH CARE EDUCATION/TRAINING PROGRAM

## 2024-02-09 PROCEDURE — 81003 URINALYSIS AUTO W/O SCOPE: CPT | Performed by: STUDENT IN AN ORGANIZED HEALTH CARE EDUCATION/TRAINING PROGRAM

## 2024-02-09 PROCEDURE — 3074F SYST BP LT 130 MM HG: CPT | Performed by: STUDENT IN AN ORGANIZED HEALTH CARE EDUCATION/TRAINING PROGRAM

## 2024-02-09 RX ORDER — NITROFURANTOIN 25; 75 MG/1; MG/1
100 CAPSULE ORAL 2 TIMES DAILY
Qty: 10 CAPSULE | Refills: 0 | Status: SHIPPED | OUTPATIENT
Start: 2024-02-09 | End: 2024-02-14

## 2024-02-09 SDOH — ECONOMIC STABILITY: INCOME INSECURITY: HOW HARD IS IT FOR YOU TO PAY FOR THE VERY BASICS LIKE FOOD, HOUSING, MEDICAL CARE, AND HEATING?: NOT HARD AT ALL

## 2024-02-09 SDOH — ECONOMIC STABILITY: FOOD INSECURITY: WITHIN THE PAST 12 MONTHS, THE FOOD YOU BOUGHT JUST DIDN'T LAST AND YOU DIDN'T HAVE MONEY TO GET MORE.: NEVER TRUE

## 2024-02-09 SDOH — ECONOMIC STABILITY: FOOD INSECURITY: WITHIN THE PAST 12 MONTHS, YOU WORRIED THAT YOUR FOOD WOULD RUN OUT BEFORE YOU GOT MONEY TO BUY MORE.: NEVER TRUE

## 2024-02-09 ASSESSMENT — PATIENT HEALTH QUESTIONNAIRE - PHQ9
8. MOVING OR SPEAKING SO SLOWLY THAT OTHER PEOPLE COULD HAVE NOTICED. OR THE OPPOSITE, BEING SO FIGETY OR RESTLESS THAT YOU HAVE BEEN MOVING AROUND A LOT MORE THAN USUAL: 0
3. TROUBLE FALLING OR STAYING ASLEEP: 0
10. IF YOU CHECKED OFF ANY PROBLEMS, HOW DIFFICULT HAVE THESE PROBLEMS MADE IT FOR YOU TO DO YOUR WORK, TAKE CARE OF THINGS AT HOME, OR GET ALONG WITH OTHER PEOPLE: 0
9. THOUGHTS THAT YOU WOULD BE BETTER OFF DEAD, OR OF HURTING YOURSELF: 0
7. TROUBLE CONCENTRATING ON THINGS, SUCH AS READING THE NEWSPAPER OR WATCHING TELEVISION: 0
SUM OF ALL RESPONSES TO PHQ QUESTIONS 1-9: 0
SUM OF ALL RESPONSES TO PHQ9 QUESTIONS 1 & 2: 0
SUM OF ALL RESPONSES TO PHQ QUESTIONS 1-9: 0
4. FEELING TIRED OR HAVING LITTLE ENERGY: 0
SUM OF ALL RESPONSES TO PHQ QUESTIONS 1-9: 0
5. POOR APPETITE OR OVEREATING: 0
SUM OF ALL RESPONSES TO PHQ QUESTIONS 1-9: 0
2. FEELING DOWN, DEPRESSED OR HOPELESS: 0
1. LITTLE INTEREST OR PLEASURE IN DOING THINGS: 0
6. FEELING BAD ABOUT YOURSELF - OR THAT YOU ARE A FAILURE OR HAVE LET YOURSELF OR YOUR FAMILY DOWN: 0

## 2024-02-09 ASSESSMENT — ENCOUNTER SYMPTOMS
ABDOMINAL PAIN: 0
RHINORRHEA: 0
BACK PAIN: 0
BLOOD IN STOOL: 0
COUGH: 0
VOMITING: 0
WHEEZING: 0
DIARRHEA: 0
NAUSEA: 0
SHORTNESS OF BREATH: 0
CHEST TIGHTNESS: 0
ROS SKIN COMMENTS: HAIR LOSS

## 2024-02-09 ASSESSMENT — ANXIETY QUESTIONNAIRES
6. BECOMING EASILY ANNOYED OR IRRITABLE: 0
4. TROUBLE RELAXING: 0
GAD7 TOTAL SCORE: 0
2. NOT BEING ABLE TO STOP OR CONTROL WORRYING: 0
3. WORRYING TOO MUCH ABOUT DIFFERENT THINGS: 0
IF YOU CHECKED OFF ANY PROBLEMS ON THIS QUESTIONNAIRE, HOW DIFFICULT HAVE THESE PROBLEMS MADE IT FOR YOU TO DO YOUR WORK, TAKE CARE OF THINGS AT HOME, OR GET ALONG WITH OTHER PEOPLE: NOT DIFFICULT AT ALL
7. FEELING AFRAID AS IF SOMETHING AWFUL MIGHT HAPPEN: 0
1. FEELING NERVOUS, ANXIOUS, OR ON EDGE: 0
5. BEING SO RESTLESS THAT IT IS HARD TO SIT STILL: 0

## 2024-02-09 NOTE — PROGRESS NOTES
\"Have you been to the ER, urgent care clinic since your last visit?  Hospitalized since your last visit?\"    YES - When: approximately 2  weeks ago.  Where and Why: [facility] [address] left knee sprang .    “Have you seen or consulted any other health care providers outside of [facility] since your last visit?”    NO

## 2024-02-09 NOTE — PROGRESS NOTES
Lay Carrillo is a 43 y.o. female presenting today for Follow-up (UTI concerns)  .     Chief Complaint   Patient presents with    Follow-up     UTI concerns       HPI:  Lay Carrillo presents to the office today for follow up.    Patient has a PMHx of Obesity, HTN, iron deficiency anemia, eczema, fatty liver.     HTN: Patient is no longer on the amlodipine.  BP is 132/82.  BP improved with weight loss.     Itchy skin: Patient is now following with dermatology.  States that the rash and itching has improved.  Taking hydroxyzine as needed.  She has been prescribed nystatin powder for intertrigo.     Anemia: Patient has a history of iron deficiency anemia.  Currently taking iron supplements daily.  Denies any dizziness or lightheadedness.  Hemoglobin in 10/23 was 10.7 with RDW 19, ferritin 13.  Patient is complaining of restless legs, hair loss.  Has not had improvement in iron level despite the oral iron.  She was set up for IV infusions but patient no-show to her appointment -states she could not afford the co-pay.  Repeat CBC in 2/24 showed hemoglobin 9.6 with ferritin 6    Vitamin D deficiency: Vitamin D level was 32     Obesity: Patient is s/p Salma-en-Y gastric bypass on 5/15/2023.    Today, patient is complaining of dysuria and increased urinary frequency.      Review of Systems   Constitutional:  Positive for fatigue. Negative for activity change, appetite change, chills, diaphoresis, fever and unexpected weight change.   HENT:  Negative for congestion, nosebleeds, postnasal drip and rhinorrhea.    Respiratory:  Negative for cough, chest tightness, shortness of breath and wheezing.    Cardiovascular:  Negative for chest pain and leg swelling.   Gastrointestinal:  Negative for abdominal pain, blood in stool, diarrhea, nausea and vomiting.   Endocrine: Negative for polydipsia and polyphagia.   Genitourinary:  Positive for dysuria, frequency and urgency. Negative for decreased urine volume and pelvic

## 2024-05-14 ENCOUNTER — OFFICE VISIT (OUTPATIENT)
Facility: CLINIC | Age: 44
End: 2024-05-14
Payer: COMMERCIAL

## 2024-05-14 VITALS
OXYGEN SATURATION: 99 % | RESPIRATION RATE: 16 BRPM | SYSTOLIC BLOOD PRESSURE: 130 MMHG | TEMPERATURE: 98.6 F | BODY MASS INDEX: 36.19 KG/M2 | WEIGHT: 212 LBS | DIASTOLIC BLOOD PRESSURE: 85 MMHG | HEART RATE: 103 BPM | HEIGHT: 64 IN

## 2024-05-14 DIAGNOSIS — D50.0 IRON DEFICIENCY ANEMIA SECONDARY TO BLOOD LOSS (CHRONIC): Primary | ICD-10-CM

## 2024-05-14 DIAGNOSIS — E55.9 VITAMIN D DEFICIENCY: ICD-10-CM

## 2024-05-14 DIAGNOSIS — R53.82 CHRONIC FATIGUE: ICD-10-CM

## 2024-05-14 DIAGNOSIS — Z98.84 STATUS POST BARIATRIC SURGERY: ICD-10-CM

## 2024-05-14 DIAGNOSIS — I10 ESSENTIAL (PRIMARY) HYPERTENSION: ICD-10-CM

## 2024-05-14 DIAGNOSIS — G47.00 INSOMNIA, UNSPECIFIED TYPE: ICD-10-CM

## 2024-05-14 DIAGNOSIS — R21 RASH AND OTHER NONSPECIFIC SKIN ERUPTION: ICD-10-CM

## 2024-05-14 DIAGNOSIS — G25.81 RESTLESS LEGS: ICD-10-CM

## 2024-05-14 DIAGNOSIS — L65.9 HAIR LOSS: ICD-10-CM

## 2024-05-14 PROCEDURE — 99214 OFFICE O/P EST MOD 30 MIN: CPT | Performed by: STUDENT IN AN ORGANIZED HEALTH CARE EDUCATION/TRAINING PROGRAM

## 2024-05-14 PROCEDURE — 3079F DIAST BP 80-89 MM HG: CPT | Performed by: STUDENT IN AN ORGANIZED HEALTH CARE EDUCATION/TRAINING PROGRAM

## 2024-05-14 PROCEDURE — 3075F SYST BP GE 130 - 139MM HG: CPT | Performed by: STUDENT IN AN ORGANIZED HEALTH CARE EDUCATION/TRAINING PROGRAM

## 2024-05-14 RX ORDER — TRAZODONE HYDROCHLORIDE 50 MG/1
50 TABLET ORAL NIGHTLY PRN
Qty: 30 TABLET | Refills: 2 | Status: SHIPPED | OUTPATIENT
Start: 2024-05-14

## 2024-05-14 RX ORDER — DIPHENHYDRAMINE HYDROCHLORIDE 25 MG/1
TABLET ORAL
COMMUNITY

## 2024-05-14 RX ORDER — HYDROXYZINE 50 MG/1
50 TABLET, FILM COATED ORAL EVERY 8 HOURS PRN
Qty: 30 TABLET | Refills: 1 | Status: SHIPPED | OUTPATIENT
Start: 2024-05-14

## 2024-05-14 SDOH — ECONOMIC STABILITY: FOOD INSECURITY: WITHIN THE PAST 12 MONTHS, THE FOOD YOU BOUGHT JUST DIDN'T LAST AND YOU DIDN'T HAVE MONEY TO GET MORE.: NEVER TRUE

## 2024-05-14 SDOH — ECONOMIC STABILITY: FOOD INSECURITY: WITHIN THE PAST 12 MONTHS, YOU WORRIED THAT YOUR FOOD WOULD RUN OUT BEFORE YOU GOT MONEY TO BUY MORE.: NEVER TRUE

## 2024-05-14 SDOH — ECONOMIC STABILITY: INCOME INSECURITY: HOW HARD IS IT FOR YOU TO PAY FOR THE VERY BASICS LIKE FOOD, HOUSING, MEDICAL CARE, AND HEATING?: NOT HARD AT ALL

## 2024-05-14 ASSESSMENT — ANXIETY QUESTIONNAIRES
3. WORRYING TOO MUCH ABOUT DIFFERENT THINGS: NOT AT ALL
2. NOT BEING ABLE TO STOP OR CONTROL WORRYING: NOT AT ALL
5. BEING SO RESTLESS THAT IT IS HARD TO SIT STILL: NOT AT ALL
GAD7 TOTAL SCORE: 0
4. TROUBLE RELAXING: NOT AT ALL
6. BECOMING EASILY ANNOYED OR IRRITABLE: NOT AT ALL
1. FEELING NERVOUS, ANXIOUS, OR ON EDGE: NOT AT ALL
IF YOU CHECKED OFF ANY PROBLEMS ON THIS QUESTIONNAIRE, HOW DIFFICULT HAVE THESE PROBLEMS MADE IT FOR YOU TO DO YOUR WORK, TAKE CARE OF THINGS AT HOME, OR GET ALONG WITH OTHER PEOPLE: NOT DIFFICULT AT ALL

## 2024-05-14 ASSESSMENT — ENCOUNTER SYMPTOMS
ABDOMINAL PAIN: 0
WHEEZING: 0
BLOOD IN STOOL: 0
VOMITING: 0
DIARRHEA: 0
BACK PAIN: 0
SHORTNESS OF BREATH: 0
CHEST TIGHTNESS: 0
COUGH: 0
ROS SKIN COMMENTS: HAIR LOSS
RHINORRHEA: 0

## 2024-05-14 ASSESSMENT — PATIENT HEALTH QUESTIONNAIRE - PHQ9
2. FEELING DOWN, DEPRESSED OR HOPELESS: NOT AT ALL
SUM OF ALL RESPONSES TO PHQ QUESTIONS 1-9: 0
10. IF YOU CHECKED OFF ANY PROBLEMS, HOW DIFFICULT HAVE THESE PROBLEMS MADE IT FOR YOU TO DO YOUR WORK, TAKE CARE OF THINGS AT HOME, OR GET ALONG WITH OTHER PEOPLE: NOT DIFFICULT AT ALL
1. LITTLE INTEREST OR PLEASURE IN DOING THINGS: NOT AT ALL
5. POOR APPETITE OR OVEREATING: NOT AT ALL
SUM OF ALL RESPONSES TO PHQ QUESTIONS 1-9: 0
4. FEELING TIRED OR HAVING LITTLE ENERGY: NOT AT ALL
3. TROUBLE FALLING OR STAYING ASLEEP: NOT AT ALL
SUM OF ALL RESPONSES TO PHQ QUESTIONS 1-9: 0
6. FEELING BAD ABOUT YOURSELF - OR THAT YOU ARE A FAILURE OR HAVE LET YOURSELF OR YOUR FAMILY DOWN: NOT AT ALL
7. TROUBLE CONCENTRATING ON THINGS, SUCH AS READING THE NEWSPAPER OR WATCHING TELEVISION: NOT AT ALL
9. THOUGHTS THAT YOU WOULD BE BETTER OFF DEAD, OR OF HURTING YOURSELF: NOT AT ALL
SUM OF ALL RESPONSES TO PHQ9 QUESTIONS 1 & 2: 0
8. MOVING OR SPEAKING SO SLOWLY THAT OTHER PEOPLE COULD HAVE NOTICED. OR THE OPPOSITE, BEING SO FIGETY OR RESTLESS THAT YOU HAVE BEEN MOVING AROUND A LOT MORE THAN USUAL: NOT AT ALL
SUM OF ALL RESPONSES TO PHQ QUESTIONS 1-9: 0

## 2024-05-14 NOTE — PROGRESS NOTES
\"Have you been to the ER, urgent care clinic since your last visit?  Hospitalized since your last visit?\"    NO    “Have you seen or consulted any other health care providers outside of [facility] since your last visit?”    NO            Click Here for Release of Records Request   
status: Former     Current packs/day: 0.00     Average packs/day: 0.1 packs/day for 2.0 years (0.2 ttl pk-yrs)     Types: Cigarettes     Start date: 10/9/2010     Quit date: 10/9/2012     Years since quittin.6    Smokeless tobacco: Never   Substance and Sexual Activity    Alcohol use: No     Alcohol/week: 1.7 standard drinks of alcohol    Drug use: No    Sexual activity: Not on file     Comment: removed iud 2021   Other Topics Concern    Not on file   Social History Narrative    Not on file     Social Determinants of Health     Financial Resource Strain: Low Risk  (2024)    Overall Financial Resource Strain (CARDIA)     Difficulty of Paying Living Expenses: Not hard at all   Food Insecurity: No Food Insecurity (2024)    Hunger Vital Sign     Worried About Running Out of Food in the Last Year: Never true     Ran Out of Food in the Last Year: Never true   Transportation Needs: Unknown (2024)    PRAPARE - Transportation     Lack of Transportation (Medical): Not on file     Lack of Transportation (Non-Medical): No   Physical Activity: Sufficiently Active (2022)    Exercise Vital Sign     Days of Exercise per Week: 4 days     Minutes of Exercise per Session: 50 min   Stress: Not on file   Social Connections: Not on file   Intimate Partner Violence: Not At Risk (2022)    Humiliation, Afraid, Rape, and Kick questionnaire     Fear of Current or Ex-Partner: No     Emotionally Abused: No     Physically Abused: No     Sexually Abused: No   Housing Stability: Unknown (2024)    Housing Stability Vital Sign     Unable to Pay for Housing in the Last Year: Not on file     Number of Places Lived in the Last Year: Not on file     Unstable Housing in the Last Year: No       Current Outpatient Medications   Medication Sig Dispense Refill    Biotin (BIOTIN 5000) 5 MG CAPS Take by mouth      B Complex-Biotin-FA (B-50 COMPLEX PO) Take by mouth      hydrOXYzine HCl (ATARAX) 50 MG tablet Take 1

## 2024-05-16 ENCOUNTER — TELEPHONE (OUTPATIENT)
Facility: CLINIC | Age: 44
End: 2024-05-16

## 2024-05-16 NOTE — TELEPHONE ENCOUNTER
Patient called -needs an order for Iron IV  Infusion sent to [facility] department .    Patient# [phone]

## 2024-05-16 NOTE — TELEPHONE ENCOUNTER
Patient informed that order for IV iron infusion had been previously sent to the location she is requesting but she had declined due to the co-pay.  Patient plans to call and find out what her co-pay would be.  She was provided with the number for [facility]

## 2024-05-20 ENCOUNTER — HOSPITAL ENCOUNTER (OUTPATIENT)
Facility: HOSPITAL | Age: 44
Setting detail: SPECIMEN
Discharge: HOME | End: 2024-05-23
Payer: COMMERCIAL

## 2024-05-20 DIAGNOSIS — D50.0 IRON DEFICIENCY ANEMIA SECONDARY TO BLOOD LOSS (CHRONIC): ICD-10-CM

## 2024-05-20 DIAGNOSIS — I10 ESSENTIAL (PRIMARY) HYPERTENSION: ICD-10-CM

## 2024-05-20 DIAGNOSIS — E55.9 VITAMIN D DEFICIENCY: ICD-10-CM

## 2024-05-20 LAB
25(OH)D3 SERPL-MCNC: 39.9 NG/ML (ref 30–100)
ALBUMIN SERPL-MCNC: 3.5 G/DL (ref 3.4–5)
ALBUMIN/GLOB SERPL: 0.9 (ref 0.8–1.7)
ALP SERPL-CCNC: 137 U/L (ref 45–117)
ALT SERPL-CCNC: 19 U/L (ref 13–56)
ANION GAP SERPL CALC-SCNC: 4 MMOL/L (ref 3–18)
AST SERPL-CCNC: 17 U/L (ref 10–38)
BASOPHILS # BLD: 0 K/UL (ref 0–0.1)
BASOPHILS NFR BLD: 1 % (ref 0–2)
BILIRUB SERPL-MCNC: 0.2 MG/DL (ref 0.2–1)
BUN SERPL-MCNC: 11 MG/DL (ref 7–18)
BUN/CREAT SERPL: 14 (ref 12–20)
CALCIUM SERPL-MCNC: 9.6 MG/DL (ref 8.5–10.1)
CHLORIDE SERPL-SCNC: 106 MMOL/L (ref 100–111)
CO2 SERPL-SCNC: 28 MMOL/L (ref 21–32)
CREAT SERPL-MCNC: 0.76 MG/DL (ref 0.6–1.3)
DIFFERENTIAL METHOD BLD: ABNORMAL
EOSINOPHIL # BLD: 0.1 K/UL (ref 0–0.4)
EOSINOPHIL NFR BLD: 1 % (ref 0–5)
ERYTHROCYTE [DISTWIDTH] IN BLOOD BY AUTOMATED COUNT: 19.7 % (ref 11.6–14.5)
FERRITIN SERPL-MCNC: 3 NG/ML (ref 8–388)
GLOBULIN SER CALC-MCNC: 3.9 G/DL (ref 2–4)
GLUCOSE SERPL-MCNC: 79 MG/DL (ref 74–99)
HCT VFR BLD AUTO: 35.3 % (ref 35–45)
HGB BLD-MCNC: 10.9 G/DL (ref 12–16)
IMM GRANULOCYTES # BLD AUTO: 0 K/UL (ref 0–0.04)
IMM GRANULOCYTES NFR BLD AUTO: 0 % (ref 0–0.5)
IRON SATN MFR SERPL: 7 % (ref 20–50)
IRON SERPL-MCNC: 29 UG/DL (ref 50–175)
LYMPHOCYTES # BLD: 1.5 K/UL (ref 0.9–3.6)
LYMPHOCYTES NFR BLD: 27 % (ref 21–52)
MCH RBC QN AUTO: 23.9 PG (ref 24–34)
MCHC RBC AUTO-ENTMCNC: 30.9 G/DL (ref 31–37)
MCV RBC AUTO: 77.4 FL (ref 78–100)
MONOCYTES # BLD: 0.5 K/UL (ref 0.05–1.2)
MONOCYTES NFR BLD: 9 % (ref 3–10)
NEUTS SEG # BLD: 3.4 K/UL (ref 1.8–8)
NEUTS SEG NFR BLD: 62 % (ref 40–73)
NRBC # BLD: 0 K/UL (ref 0–0.01)
NRBC BLD-RTO: 0 PER 100 WBC
PLATELET # BLD AUTO: 364 K/UL (ref 135–420)
PMV BLD AUTO: 11.3 FL (ref 9.2–11.8)
POTASSIUM SERPL-SCNC: 4.2 MMOL/L (ref 3.5–5.5)
PROT SERPL-MCNC: 7.4 G/DL (ref 6.4–8.2)
RBC # BLD AUTO: 4.56 M/UL (ref 4.2–5.3)
SODIUM SERPL-SCNC: 138 MMOL/L (ref 136–145)
TIBC SERPL-MCNC: 404 UG/DL (ref 250–450)
WBC # BLD AUTO: 5.5 K/UL (ref 4.6–13.2)

## 2024-05-20 PROCEDURE — 36415 COLL VENOUS BLD VENIPUNCTURE: CPT

## 2024-05-20 PROCEDURE — 85025 COMPLETE CBC W/AUTO DIFF WBC: CPT

## 2024-05-20 PROCEDURE — 80053 COMPREHEN METABOLIC PANEL: CPT

## 2024-05-20 PROCEDURE — 83540 ASSAY OF IRON: CPT

## 2024-05-20 PROCEDURE — 82728 ASSAY OF FERRITIN: CPT

## 2024-05-20 PROCEDURE — 82306 VITAMIN D 25 HYDROXY: CPT

## 2024-05-20 PROCEDURE — 83550 IRON BINDING TEST: CPT

## 2024-05-21 PROBLEM — D50.9 IRON DEFICIENCY ANEMIA: Status: ACTIVE | Noted: 2024-05-21

## 2024-05-29 ENCOUNTER — HOSPITAL ENCOUNTER (OUTPATIENT)
Facility: HOSPITAL | Age: 44
Setting detail: INFUSION SERIES
Discharge: HOME | End: 2024-06-01
Payer: COMMERCIAL

## 2024-05-29 VITALS
SYSTOLIC BLOOD PRESSURE: 123 MMHG | HEART RATE: 67 BPM | DIASTOLIC BLOOD PRESSURE: 84 MMHG | TEMPERATURE: 98.4 F | RESPIRATION RATE: 16 BRPM | OXYGEN SATURATION: 100 %

## 2024-05-29 DIAGNOSIS — D50.9 IRON DEFICIENCY ANEMIA, UNSPECIFIED IRON DEFICIENCY ANEMIA TYPE: Primary | ICD-10-CM

## 2024-05-29 PROCEDURE — 96365 THER/PROPH/DIAG IV INF INIT: CPT

## 2024-05-29 PROCEDURE — 2580000003 HC RX 258: Performed by: STUDENT IN AN ORGANIZED HEALTH CARE EDUCATION/TRAINING PROGRAM

## 2024-05-29 PROCEDURE — 6360000002 HC RX W HCPCS: Performed by: STUDENT IN AN ORGANIZED HEALTH CARE EDUCATION/TRAINING PROGRAM

## 2024-05-29 PROCEDURE — 96366 THER/PROPH/DIAG IV INF ADDON: CPT

## 2024-05-29 RX ORDER — EPINEPHRINE 1 MG/ML
0.3 INJECTION, SOLUTION, CONCENTRATE INTRAVENOUS PRN
OUTPATIENT
Start: 2024-06-12

## 2024-05-29 RX ORDER — SODIUM CHLORIDE 9 MG/ML
INJECTION, SOLUTION INTRAVENOUS CONTINUOUS
OUTPATIENT
Start: 2024-06-12

## 2024-05-29 RX ORDER — ALBUTEROL SULFATE 90 UG/1
4 AEROSOL, METERED RESPIRATORY (INHALATION) PRN
OUTPATIENT
Start: 2024-06-12

## 2024-05-29 RX ORDER — DIPHENHYDRAMINE HYDROCHLORIDE 50 MG/ML
50 INJECTION INTRAMUSCULAR; INTRAVENOUS
OUTPATIENT
Start: 2024-06-12

## 2024-05-29 RX ORDER — SODIUM CHLORIDE 0.9 % (FLUSH) 0.9 %
5-40 SYRINGE (ML) INJECTION PRN
OUTPATIENT
Start: 2024-06-12

## 2024-05-29 RX ORDER — ACETAMINOPHEN 325 MG/1
650 TABLET ORAL
OUTPATIENT
Start: 2024-06-12

## 2024-05-29 RX ORDER — SODIUM CHLORIDE 9 MG/ML
5-250 INJECTION, SOLUTION INTRAVENOUS PRN
OUTPATIENT
Start: 2024-06-12

## 2024-05-29 RX ORDER — SODIUM CHLORIDE 0.9 % (FLUSH) 0.9 %
5-40 SYRINGE (ML) INJECTION PRN
Status: ACTIVE | OUTPATIENT
Start: 2024-05-29 | End: 2024-05-30

## 2024-05-29 RX ORDER — HEPARIN 100 UNIT/ML
500 SYRINGE INTRAVENOUS PRN
OUTPATIENT
Start: 2024-06-12

## 2024-05-29 RX ORDER — DOCUSATE SODIUM 100 MG/1
100 CAPSULE, LIQUID FILLED ORAL AS NEEDED
COMMUNITY

## 2024-05-29 RX ORDER — ONDANSETRON 2 MG/ML
8 INJECTION INTRAMUSCULAR; INTRAVENOUS
OUTPATIENT
Start: 2024-06-12

## 2024-05-29 RX ORDER — SODIUM CHLORIDE 9 MG/ML
5-250 INJECTION, SOLUTION INTRAVENOUS PRN
Status: ACTIVE | OUTPATIENT
Start: 2024-05-29 | End: 2024-05-30

## 2024-05-29 RX ADMIN — SODIUM CHLORIDE, PRESERVATIVE FREE 10 ML: 5 INJECTION INTRAVENOUS at 08:20

## 2024-05-29 RX ADMIN — SODIUM CHLORIDE 300 MG: 9 INJECTION, SOLUTION INTRAVENOUS at 08:35

## 2024-05-29 RX ADMIN — SODIUM CHLORIDE 25 ML/HR: 9 INJECTION, SOLUTION INTRAVENOUS at 08:30

## 2024-05-29 ASSESSMENT — PAIN - FUNCTIONAL ASSESSMENT
PAIN_FUNCTIONAL_ASSESSMENT: NONE - DENIES PAIN
PAIN_FUNCTIONAL_ASSESSMENT: NONE - DENIES PAIN

## 2024-05-29 NOTE — PROGRESS NOTES
[facility] Progress Note    Date: May 29, 2024    Name: Lay Carrillo    MRN: [id number]         : 1980      VENOFER (DOSE 1 of 3) (EVERY 2 WEEKS)      Ms. Carrillo arrived to [facility] at 0805.    Ms. Carrillo was assessed and education was provided.     Ms. Carrillo's vitals were reviewed.  Vitals:    24 0805   BP: 120/81   Pulse: 77   Resp: 16   Temp: 97.8 °F (36.6 °C)   SpO2: 100%     Patient endorses fatigue and irregular menses related to iron deficiency anemia.    22g IV inserted in patient's Right AC x1 attempt.  Positive for blood return/ flushes without difficulty.    250mL 0.9% NS administered @ KVO per orders.    First dose patient education discussed with patient, she signed acknowledgement form and verbalizes understanding. Patient has no further questions at this time.    Venofer 300mg infused over approximately 90 minutes as ordered, followed by NS infusion flush.    Patient remained in [facility] for 30 minutes after Venofer infusion completed to monitor as a precaution. Patient denies any transfusion reaction symptoms at this time.    Ms. Carrillo tolerated well without complaints.    IV removed.  Gauze/ coban to site.    [facility]. Carr[facility] in stable condition at 1050.  She is to return on 24 at 9:00 AM for her next appointment.    Sanjuana Senior RN  May 29, 2024

## 2024-06-12 ENCOUNTER — HOSPITAL ENCOUNTER (OUTPATIENT)
Facility: HOSPITAL | Age: 44
Setting detail: INFUSION SERIES
Discharge: HOME | End: 2024-06-15
Payer: COMMERCIAL

## 2024-06-12 VITALS
OXYGEN SATURATION: 100 % | SYSTOLIC BLOOD PRESSURE: 122 MMHG | HEART RATE: 61 BPM | RESPIRATION RATE: 16 BRPM | TEMPERATURE: 98.3 F | DIASTOLIC BLOOD PRESSURE: 78 MMHG

## 2024-06-12 DIAGNOSIS — D50.9 IRON DEFICIENCY ANEMIA, UNSPECIFIED IRON DEFICIENCY ANEMIA TYPE: Primary | ICD-10-CM

## 2024-06-12 PROCEDURE — 6360000002 HC RX W HCPCS: Performed by: STUDENT IN AN ORGANIZED HEALTH CARE EDUCATION/TRAINING PROGRAM

## 2024-06-12 PROCEDURE — 2580000003 HC RX 258: Performed by: STUDENT IN AN ORGANIZED HEALTH CARE EDUCATION/TRAINING PROGRAM

## 2024-06-12 PROCEDURE — 96366 THER/PROPH/DIAG IV INF ADDON: CPT

## 2024-06-12 PROCEDURE — 96365 THER/PROPH/DIAG IV INF INIT: CPT

## 2024-06-12 RX ORDER — ALBUTEROL SULFATE 90 UG/1
4 AEROSOL, METERED RESPIRATORY (INHALATION) PRN
OUTPATIENT
Start: 2024-06-26

## 2024-06-12 RX ORDER — ONDANSETRON 2 MG/ML
8 INJECTION INTRAMUSCULAR; INTRAVENOUS
OUTPATIENT
Start: 2024-06-26

## 2024-06-12 RX ORDER — SODIUM CHLORIDE 0.9 % (FLUSH) 0.9 %
5-40 SYRINGE (ML) INJECTION PRN
OUTPATIENT
Start: 2024-06-26

## 2024-06-12 RX ORDER — EPINEPHRINE 1 MG/ML
0.3 INJECTION, SOLUTION, CONCENTRATE INTRAVENOUS PRN
OUTPATIENT
Start: 2024-06-26

## 2024-06-12 RX ORDER — SODIUM CHLORIDE 9 MG/ML
5-250 INJECTION, SOLUTION INTRAVENOUS PRN
OUTPATIENT
Start: 2024-06-26

## 2024-06-12 RX ORDER — HEPARIN 100 UNIT/ML
500 SYRINGE INTRAVENOUS PRN
OUTPATIENT
Start: 2024-06-26

## 2024-06-12 RX ORDER — SODIUM CHLORIDE 0.9 % (FLUSH) 0.9 %
5-40 SYRINGE (ML) INJECTION PRN
Status: ACTIVE | OUTPATIENT
Start: 2024-06-12 | End: 2024-06-13

## 2024-06-12 RX ORDER — SODIUM CHLORIDE 9 MG/ML
INJECTION, SOLUTION INTRAVENOUS CONTINUOUS
OUTPATIENT
Start: 2024-06-26

## 2024-06-12 RX ORDER — SODIUM CHLORIDE 9 MG/ML
5-250 INJECTION, SOLUTION INTRAVENOUS PRN
Status: ACTIVE | OUTPATIENT
Start: 2024-06-12 | End: 2024-06-13

## 2024-06-12 RX ORDER — ACETAMINOPHEN 325 MG/1
650 TABLET ORAL
OUTPATIENT
Start: 2024-06-26

## 2024-06-12 RX ORDER — DIPHENHYDRAMINE HYDROCHLORIDE 50 MG/ML
50 INJECTION INTRAMUSCULAR; INTRAVENOUS
OUTPATIENT
Start: 2024-06-26

## 2024-06-12 RX ADMIN — SODIUM CHLORIDE, PRESERVATIVE FREE 10 ML: 5 INJECTION INTRAVENOUS at 09:05

## 2024-06-12 RX ADMIN — SODIUM CHLORIDE 25 ML/HR: 9 INJECTION, SOLUTION INTRAVENOUS at 09:13

## 2024-06-12 RX ADMIN — IRON SUCROSE 300 MG: 20 INJECTION, SOLUTION INTRAVENOUS at 09:15

## 2024-06-12 ASSESSMENT — PAIN - FUNCTIONAL ASSESSMENT
PAIN_FUNCTIONAL_ASSESSMENT: NONE - DENIES PAIN

## 2024-06-12 NOTE — PROGRESS NOTES
[facility] Progress Note    Date: 2024    Name: Lay Carrillo    MRN: [id number]         : 1980      VENOFER (DOSE 2 of 3) (EVERY 2 WEEKS)      Ms. Carrillo arrived to [facility] at 0855.    Ms. Carrillo was assessed and education was provided.     Ms. Carrillo's vitals were reviewed.  Vitals:    24 0857   BP: 132/87   Pulse: 87   Resp: 16   Temp: 98.2 °F (36.8 °C)   SpO2: 98%     Patient reports her fatigue has improved slightly since receiving first dose of Venofer. Patient did report mild side effects the day after the infusion (dizziness, back discomfort) that soon resolved.    22g IV inserted in patient's left AC.  Positive for blood return/ flushes without difficulty.    250mL 0.9% NS administered @ KVO per orders.    Venofer 300mg infused over approximately 90 minutes as ordered, followed by NS infusion flush.    Patient remained in [facility] for 30 minutes after Venofer infusion completed to monitor as a precaution. Patient denies any transfusion reaction symptoms at the end of observation period. Encouraged patient to monitor for any side effects/symptoms going forward and go to ED if severe adverse reaction symptoms occur.    IV removed.  Gauze/ coban to site.    Ms. Carrillo was [facility] in stable condition at 1135.  She is to return on 24 at 8:00 AM for her next appointment.    Sanjuana Senior RN  2024

## 2024-06-17 ENCOUNTER — HOSPITAL ENCOUNTER (OUTPATIENT)
Facility: HOSPITAL | Age: 44
Discharge: HOME | End: 2024-06-20
Payer: COMMERCIAL

## 2024-06-17 VITALS — HEIGHT: 64 IN | WEIGHT: 212.08 LBS | BODY MASS INDEX: 36.21 KG/M2

## 2024-06-17 DIAGNOSIS — Z12.31 SCREENING MAMMOGRAM FOR BREAST CANCER: ICD-10-CM

## 2024-06-17 PROCEDURE — 77063 BREAST TOMOSYNTHESIS BI: CPT

## 2024-07-01 ENCOUNTER — HOSPITAL ENCOUNTER (OUTPATIENT)
Facility: HOSPITAL | Age: 44
Setting detail: INFUSION SERIES
Discharge: HOME | End: 2024-07-04
Payer: COMMERCIAL

## 2024-07-01 VITALS
RESPIRATION RATE: 16 BRPM | TEMPERATURE: 98.2 F | SYSTOLIC BLOOD PRESSURE: 117 MMHG | DIASTOLIC BLOOD PRESSURE: 82 MMHG | OXYGEN SATURATION: 100 % | HEART RATE: 78 BPM

## 2024-07-01 DIAGNOSIS — D50.9 IRON DEFICIENCY ANEMIA, UNSPECIFIED IRON DEFICIENCY ANEMIA TYPE: Primary | ICD-10-CM

## 2024-07-01 PROCEDURE — 96365 THER/PROPH/DIAG IV INF INIT: CPT

## 2024-07-01 PROCEDURE — 6360000002 HC RX W HCPCS: Performed by: STUDENT IN AN ORGANIZED HEALTH CARE EDUCATION/TRAINING PROGRAM

## 2024-07-01 PROCEDURE — 96366 THER/PROPH/DIAG IV INF ADDON: CPT

## 2024-07-01 PROCEDURE — 2580000003 HC RX 258: Performed by: STUDENT IN AN ORGANIZED HEALTH CARE EDUCATION/TRAINING PROGRAM

## 2024-07-01 RX ORDER — EPINEPHRINE 1 MG/ML
0.3 INJECTION, SOLUTION, CONCENTRATE INTRAVENOUS PRN
OUTPATIENT
Start: 2024-07-01

## 2024-07-01 RX ORDER — SODIUM CHLORIDE 9 MG/ML
5-250 INJECTION, SOLUTION INTRAVENOUS PRN
OUTPATIENT
Start: 2024-07-01

## 2024-07-01 RX ORDER — SODIUM CHLORIDE 9 MG/ML
INJECTION, SOLUTION INTRAVENOUS CONTINUOUS
OUTPATIENT
Start: 2024-07-01

## 2024-07-01 RX ORDER — ACETAMINOPHEN 325 MG/1
650 TABLET ORAL
OUTPATIENT
Start: 2024-07-01

## 2024-07-01 RX ORDER — SODIUM CHLORIDE 0.9 % (FLUSH) 0.9 %
5-40 SYRINGE (ML) INJECTION PRN
Status: ACTIVE | OUTPATIENT
Start: 2024-07-01 | End: 2024-07-02

## 2024-07-01 RX ORDER — SODIUM CHLORIDE 0.9 % (FLUSH) 0.9 %
5-40 SYRINGE (ML) INJECTION PRN
OUTPATIENT
Start: 2024-07-01

## 2024-07-01 RX ORDER — ONDANSETRON 2 MG/ML
8 INJECTION INTRAMUSCULAR; INTRAVENOUS
OUTPATIENT
Start: 2024-07-01

## 2024-07-01 RX ORDER — DIPHENHYDRAMINE HYDROCHLORIDE 50 MG/ML
50 INJECTION INTRAMUSCULAR; INTRAVENOUS
OUTPATIENT
Start: 2024-07-01

## 2024-07-01 RX ORDER — HEPARIN 100 UNIT/ML
500 SYRINGE INTRAVENOUS PRN
OUTPATIENT
Start: 2024-07-01

## 2024-07-01 RX ORDER — SODIUM CHLORIDE 9 MG/ML
5-250 INJECTION, SOLUTION INTRAVENOUS PRN
Status: ACTIVE | OUTPATIENT
Start: 2024-07-01 | End: 2024-07-02

## 2024-07-01 RX ORDER — ALBUTEROL SULFATE 90 UG/1
4 AEROSOL, METERED RESPIRATORY (INHALATION) PRN
OUTPATIENT
Start: 2024-07-01

## 2024-07-01 RX ADMIN — SODIUM CHLORIDE, PRESERVATIVE FREE 10 ML: 5 INJECTION INTRAVENOUS at 08:28

## 2024-07-01 RX ADMIN — SODIUM CHLORIDE 25 ML/HR: 9 INJECTION, SOLUTION INTRAVENOUS at 08:29

## 2024-07-01 RX ADMIN — IRON SUCROSE 400 MG: 20 INJECTION, SOLUTION INTRAVENOUS at 08:36

## 2024-07-01 NOTE — PROGRESS NOTES
[facility] OPI Progress Note    Date: 2024    Name: Lay Carrillo    MRN: [id number]         : 1980      VENOFER (DOSE 3 of 3) (EVERY 2 WEEKS)      Ms. Carrillo arrived to [facility] at 0815.    Ms. Carrillo was assessed and education was provided.     Ms. Carrillo's vitals were reviewed.  Vitals:    24 0815   BP: 115/73   Pulse: 77   Resp: 18   Temp: 98.1 °F (36.7 °C)   SpO2: 98%     Patient reports she is feeling much better after her first two doses of Venofer.     22g IV inserted in patient's Right AC x1 attempt.  Positive for blood return/ flushes without difficulty.    250 mL 0.9% NS administered @ KVO per orders.    Venofer 400 mg infused over 150 minutes as ordered, followed by NS infusion flush.    Patient remained in [facility] for 30 minutes after Venofer infusion completed to monitor as a precaution. Patient denies any infusion reaction symptoms at this time.    Ms. Carrillo tolerated well without complaints.    Discharge/ follow up instructions reviewed w/ patient and she verbalized understanding.     IV removed.  Gauze/ coban to site.    Ms. Carrillo was [facility] in stable condition at 1200.  She has no further appointments at this time.    Stella Goldstein RN  2024

## 2024-08-23 ENCOUNTER — OFFICE VISIT (OUTPATIENT)
Facility: CLINIC | Age: 44
End: 2024-08-23
Payer: COMMERCIAL

## 2024-08-23 VITALS
HEIGHT: 64 IN | OXYGEN SATURATION: 100 % | WEIGHT: 212 LBS | DIASTOLIC BLOOD PRESSURE: 64 MMHG | HEART RATE: 70 BPM | BODY MASS INDEX: 36.19 KG/M2 | RESPIRATION RATE: 16 BRPM | SYSTOLIC BLOOD PRESSURE: 105 MMHG | TEMPERATURE: 98.6 F

## 2024-08-23 DIAGNOSIS — Z98.84 STATUS POST BARIATRIC SURGERY: ICD-10-CM

## 2024-08-23 DIAGNOSIS — R53.82 CHRONIC FATIGUE: ICD-10-CM

## 2024-08-23 DIAGNOSIS — G47.00 INSOMNIA, UNSPECIFIED TYPE: ICD-10-CM

## 2024-08-23 DIAGNOSIS — G25.81 RESTLESS LEGS: ICD-10-CM

## 2024-08-23 DIAGNOSIS — I10 ESSENTIAL (PRIMARY) HYPERTENSION: ICD-10-CM

## 2024-08-23 DIAGNOSIS — D50.0 IRON DEFICIENCY ANEMIA SECONDARY TO BLOOD LOSS (CHRONIC): Primary | ICD-10-CM

## 2024-08-23 DIAGNOSIS — E66.01 CLASS 2 SEVERE OBESITY WITH SERIOUS COMORBIDITY AND BODY MASS INDEX (BMI) OF 36.0 TO 36.9 IN ADULT, UNSPECIFIED OBESITY TYPE (HCC): ICD-10-CM

## 2024-08-23 PROCEDURE — 99214 OFFICE O/P EST MOD 30 MIN: CPT | Performed by: STUDENT IN AN ORGANIZED HEALTH CARE EDUCATION/TRAINING PROGRAM

## 2024-08-23 PROCEDURE — 3078F DIAST BP <80 MM HG: CPT | Performed by: STUDENT IN AN ORGANIZED HEALTH CARE EDUCATION/TRAINING PROGRAM

## 2024-08-23 PROCEDURE — 3074F SYST BP LT 130 MM HG: CPT | Performed by: STUDENT IN AN ORGANIZED HEALTH CARE EDUCATION/TRAINING PROGRAM

## 2024-08-23 RX ORDER — GABAPENTIN 100 MG/1
100 CAPSULE ORAL 3 TIMES DAILY
COMMUNITY

## 2024-08-23 RX ORDER — TRAZODONE HYDROCHLORIDE 50 MG/1
50 TABLET ORAL NIGHTLY PRN
Qty: 30 TABLET | Refills: 2 | Status: SHIPPED | OUTPATIENT
Start: 2024-08-23

## 2024-08-23 SDOH — ECONOMIC STABILITY: FOOD INSECURITY: WITHIN THE PAST 12 MONTHS, THE FOOD YOU BOUGHT JUST DIDN'T LAST AND YOU DIDN'T HAVE MONEY TO GET MORE.: NEVER TRUE

## 2024-08-23 SDOH — ECONOMIC STABILITY: FOOD INSECURITY: WITHIN THE PAST 12 MONTHS, YOU WORRIED THAT YOUR FOOD WOULD RUN OUT BEFORE YOU GOT MONEY TO BUY MORE.: NEVER TRUE

## 2024-08-23 SDOH — ECONOMIC STABILITY: INCOME INSECURITY: HOW HARD IS IT FOR YOU TO PAY FOR THE VERY BASICS LIKE FOOD, HOUSING, MEDICAL CARE, AND HEATING?: NOT HARD AT ALL

## 2024-08-23 ASSESSMENT — ANXIETY QUESTIONNAIRES
7. FEELING AFRAID AS IF SOMETHING AWFUL MIGHT HAPPEN: NOT AT ALL
6. BECOMING EASILY ANNOYED OR IRRITABLE: NOT AT ALL
3. WORRYING TOO MUCH ABOUT DIFFERENT THINGS: NOT AT ALL
1. FEELING NERVOUS, ANXIOUS, OR ON EDGE: NOT AT ALL
GAD7 TOTAL SCORE: 0
2. NOT BEING ABLE TO STOP OR CONTROL WORRYING: NOT AT ALL
4. TROUBLE RELAXING: NOT AT ALL
5. BEING SO RESTLESS THAT IT IS HARD TO SIT STILL: NOT AT ALL
IF YOU CHECKED OFF ANY PROBLEMS ON THIS QUESTIONNAIRE, HOW DIFFICULT HAVE THESE PROBLEMS MADE IT FOR YOU TO DO YOUR WORK, TAKE CARE OF THINGS AT HOME, OR GET ALONG WITH OTHER PEOPLE: NOT DIFFICULT AT ALL

## 2024-08-23 ASSESSMENT — ENCOUNTER SYMPTOMS
BLOOD IN STOOL: 0
COUGH: 0
VOMITING: 0
SHORTNESS OF BREATH: 0
CHEST TIGHTNESS: 0
DIARRHEA: 0
ROS SKIN COMMENTS: HAIR LOSS
BACK PAIN: 0
RHINORRHEA: 0
NAUSEA: 0
ABDOMINAL PAIN: 0
WHEEZING: 0

## 2024-08-23 ASSESSMENT — PATIENT HEALTH QUESTIONNAIRE - PHQ9
6. FEELING BAD ABOUT YOURSELF - OR THAT YOU ARE A FAILURE OR HAVE LET YOURSELF OR YOUR FAMILY DOWN: NOT AT ALL
1. LITTLE INTEREST OR PLEASURE IN DOING THINGS: NOT AT ALL
SUM OF ALL RESPONSES TO PHQ QUESTIONS 1-9: 0
2. FEELING DOWN, DEPRESSED OR HOPELESS: NOT AT ALL
10. IF YOU CHECKED OFF ANY PROBLEMS, HOW DIFFICULT HAVE THESE PROBLEMS MADE IT FOR YOU TO DO YOUR WORK, TAKE CARE OF THINGS AT HOME, OR GET ALONG WITH OTHER PEOPLE: NOT DIFFICULT AT ALL
SUM OF ALL RESPONSES TO PHQ QUESTIONS 1-9: 0
3. TROUBLE FALLING OR STAYING ASLEEP: NOT AT ALL
SUM OF ALL RESPONSES TO PHQ QUESTIONS 1-9: 0
7. TROUBLE CONCENTRATING ON THINGS, SUCH AS READING THE NEWSPAPER OR WATCHING TELEVISION: NOT AT ALL
5. POOR APPETITE OR OVEREATING: NOT AT ALL
8. MOVING OR SPEAKING SO SLOWLY THAT OTHER PEOPLE COULD HAVE NOTICED. OR THE OPPOSITE, BEING SO FIGETY OR RESTLESS THAT YOU HAVE BEEN MOVING AROUND A LOT MORE THAN USUAL: NOT AT ALL
SUM OF ALL RESPONSES TO PHQ9 QUESTIONS 1 & 2: 0
9. THOUGHTS THAT YOU WOULD BE BETTER OFF DEAD, OR OF HURTING YOURSELF: NOT AT ALL
SUM OF ALL RESPONSES TO PHQ QUESTIONS 1-9: 0
4. FEELING TIRED OR HAVING LITTLE ENERGY: NOT AT ALL

## 2024-08-23 NOTE — PROGRESS NOTES
\"Have you been to the ER, urgent care clinic since your last visit?  Hospitalized since your last visit?\"    NO    “Have you seen or consulted any other health care providers outside of [facility] since your last visit?”    YES - When: approximately 4 months ago.  Where and Why: [facility] .            Click Here for Release of Records Request

## 2024-08-23 NOTE — PROGRESS NOTES
Lay Carrillo is a 44 y.o. female presenting today for Follow-up (6 months)  .     Chief Complaint   Patient presents with    Follow-up     6 months       HPI:  Lay Carrillo presents to the office today for follow up.    Patient has a PMHx of Obesity s/p gastric bypass in 5/2023, HTN, iron deficiency anemia, eczema, fatty liver.     HTN: Patient is no longer on the amlodipine.  BP is 105/64.  Her blood pressure improved with weight loss.     Itchy skin: Patient saw dermatology.    Taking hydroxyzine as needed.  She has been prescribed nystatin powder for intertrigo.     Anemia: Patient has a history of iron deficiency anemia.  Currently taking iron supplements daily.  Denies any dizziness or lightheadedness.  Hemoglobin 5/2024 was 10.9 with ferritin 3.  Patient is complaining of restless legs, hair loss.  Has not had improvement in iron level despite the oral iron.  She received IV Venofer infusion.  Patient reports feeling much better with more energy.    Patient is also complaining of difficulty sleeping.  States that she has tried melatonin with no success.  She was prescribed trazodone as needed with improvement.    Vitamin D deficiency: Vitamin D level was 32     Obesity: Patient is s/p Salma-en-Y gastric bypass on 5/15/2023.    Patient is now perimenopausal.  She is following with gynecology.  She was started on gabapentin for hot flashes which has helped      Review of Systems   Constitutional:  Negative for activity change, appetite change, chills, diaphoresis, fatigue, fever and unexpected weight change.   HENT:  Negative for congestion, nosebleeds, postnasal drip and rhinorrhea.    Respiratory:  Negative for cough, chest tightness, shortness of breath and wheezing.    Cardiovascular:  Negative for chest pain and leg swelling.   Gastrointestinal:  Negative for abdominal pain, blood in stool, diarrhea, nausea and vomiting.   Endocrine: Negative for polydipsia and polyphagia.   Genitourinary:

## 2024-09-17 ENCOUNTER — TELEPHONE (OUTPATIENT)
Facility: CLINIC | Age: 44
End: 2024-09-17

## 2024-12-06 ENCOUNTER — OFFICE VISIT (OUTPATIENT)
Facility: CLINIC | Age: 44
End: 2024-12-06

## 2024-12-06 VITALS
TEMPERATURE: 99.3 F | WEIGHT: 207 LBS | SYSTOLIC BLOOD PRESSURE: 126 MMHG | BODY MASS INDEX: 35.34 KG/M2 | HEIGHT: 64 IN | DIASTOLIC BLOOD PRESSURE: 85 MMHG | OXYGEN SATURATION: 98 % | RESPIRATION RATE: 18 BRPM | HEART RATE: 62 BPM

## 2024-12-06 DIAGNOSIS — Z98.84 STATUS POST BARIATRIC SURGERY: ICD-10-CM

## 2024-12-06 DIAGNOSIS — N93.9 ABNORMAL UTERINE BLEEDING: ICD-10-CM

## 2024-12-06 DIAGNOSIS — Z13.29 SCREENING FOR ENDOCRINE DISORDER: ICD-10-CM

## 2024-12-06 DIAGNOSIS — K76.0 FATTY LIVER: ICD-10-CM

## 2024-12-06 DIAGNOSIS — E55.9 VITAMIN D DEFICIENCY: ICD-10-CM

## 2024-12-06 DIAGNOSIS — I10 ESSENTIAL (PRIMARY) HYPERTENSION: ICD-10-CM

## 2024-12-06 DIAGNOSIS — R53.82 CHRONIC FATIGUE: ICD-10-CM

## 2024-12-06 DIAGNOSIS — Z13.6 SCREENING FOR CARDIOVASCULAR CONDITION: ICD-10-CM

## 2024-12-06 DIAGNOSIS — R10.84 GENERALIZED ABDOMINAL PAIN: ICD-10-CM

## 2024-12-06 DIAGNOSIS — Z23 ENCOUNTER FOR IMMUNIZATION: ICD-10-CM

## 2024-12-06 DIAGNOSIS — G47.00 INSOMNIA, UNSPECIFIED TYPE: ICD-10-CM

## 2024-12-06 DIAGNOSIS — D50.0 IRON DEFICIENCY ANEMIA SECONDARY TO BLOOD LOSS (CHRONIC): Primary | ICD-10-CM

## 2024-12-06 RX ORDER — TRAZODONE HYDROCHLORIDE 50 MG/1
50 TABLET, FILM COATED ORAL NIGHTLY PRN
Qty: 30 TABLET | Refills: 2 | Status: SHIPPED | OUTPATIENT
Start: 2024-12-06

## 2024-12-06 SDOH — ECONOMIC STABILITY: FOOD INSECURITY: WITHIN THE PAST 12 MONTHS, YOU WORRIED THAT YOUR FOOD WOULD RUN OUT BEFORE YOU GOT MONEY TO BUY MORE.: NEVER TRUE

## 2024-12-06 SDOH — ECONOMIC STABILITY: INCOME INSECURITY: HOW HARD IS IT FOR YOU TO PAY FOR THE VERY BASICS LIKE FOOD, HOUSING, MEDICAL CARE, AND HEATING?: NOT HARD AT ALL

## 2024-12-06 SDOH — ECONOMIC STABILITY: FOOD INSECURITY: WITHIN THE PAST 12 MONTHS, THE FOOD YOU BOUGHT JUST DIDN'T LAST AND YOU DIDN'T HAVE MONEY TO GET MORE.: NEVER TRUE

## 2024-12-06 ASSESSMENT — PATIENT HEALTH QUESTIONNAIRE - PHQ9
SUM OF ALL RESPONSES TO PHQ QUESTIONS 1-9: 0
1. LITTLE INTEREST OR PLEASURE IN DOING THINGS: NOT AT ALL
9. THOUGHTS THAT YOU WOULD BE BETTER OFF DEAD, OR OF HURTING YOURSELF: NOT AT ALL
SUM OF ALL RESPONSES TO PHQ QUESTIONS 1-9: 0
7. TROUBLE CONCENTRATING ON THINGS, SUCH AS READING THE NEWSPAPER OR WATCHING TELEVISION: NOT AT ALL
6. FEELING BAD ABOUT YOURSELF - OR THAT YOU ARE A FAILURE OR HAVE LET YOURSELF OR YOUR FAMILY DOWN: NOT AT ALL
3. TROUBLE FALLING OR STAYING ASLEEP: NOT AT ALL
8. MOVING OR SPEAKING SO SLOWLY THAT OTHER PEOPLE COULD HAVE NOTICED. OR THE OPPOSITE, BEING SO FIGETY OR RESTLESS THAT YOU HAVE BEEN MOVING AROUND A LOT MORE THAN USUAL: NOT AT ALL
SUM OF ALL RESPONSES TO PHQ9 QUESTIONS 1 & 2: 0
4. FEELING TIRED OR HAVING LITTLE ENERGY: NOT AT ALL
5. POOR APPETITE OR OVEREATING: NOT AT ALL
10. IF YOU CHECKED OFF ANY PROBLEMS, HOW DIFFICULT HAVE THESE PROBLEMS MADE IT FOR YOU TO DO YOUR WORK, TAKE CARE OF THINGS AT HOME, OR GET ALONG WITH OTHER PEOPLE: NOT DIFFICULT AT ALL
2. FEELING DOWN, DEPRESSED OR HOPELESS: NOT AT ALL

## 2024-12-06 ASSESSMENT — ENCOUNTER SYMPTOMS
RHINORRHEA: 0
BACK PAIN: 0
BLOOD IN STOOL: 0
NAUSEA: 0
WHEEZING: 0
SHORTNESS OF BREATH: 0
ABDOMINAL PAIN: 1
CHEST TIGHTNESS: 0
COUGH: 0
VOMITING: 0
DIARRHEA: 0
ROS SKIN COMMENTS: HAIR LOSS

## 2024-12-06 ASSESSMENT — ANXIETY QUESTIONNAIRES
GAD7 TOTAL SCORE: 0
2. NOT BEING ABLE TO STOP OR CONTROL WORRYING: NOT AT ALL
4. TROUBLE RELAXING: NOT AT ALL
IF YOU CHECKED OFF ANY PROBLEMS ON THIS QUESTIONNAIRE, HOW DIFFICULT HAVE THESE PROBLEMS MADE IT FOR YOU TO DO YOUR WORK, TAKE CARE OF THINGS AT HOME, OR GET ALONG WITH OTHER PEOPLE: NOT DIFFICULT AT ALL
1. FEELING NERVOUS, ANXIOUS, OR ON EDGE: NOT AT ALL
3. WORRYING TOO MUCH ABOUT DIFFERENT THINGS: NOT AT ALL
6. BECOMING EASILY ANNOYED OR IRRITABLE: NOT AT ALL
7. FEELING AFRAID AS IF SOMETHING AWFUL MIGHT HAPPEN: NOT AT ALL
5. BEING SO RESTLESS THAT IT IS HARD TO SIT STILL: NOT AT ALL

## 2024-12-06 NOTE — PROGRESS NOTES
Lay Carrillo is a 44 y.o. female presenting today for Follow-up  .     Chief Complaint   Patient presents with    Follow-up       HPI:  Lay Carrillo presents to the office today for follow up.    Patient has a PMHx of Obesity s/p gastric bypass in 5/2023, HTN, iron deficiency anemia, eczema, fatty liver.     HTN: Patient is no longer on the amlodipine.  Her blood pressure improved with weight loss.  BP today is 126/85.     Itchy skin: Patient saw dermatology.    Taking hydroxyzine as needed.  She has been prescribed nystatin powder for intertrigo.     Anemia: Patient has a history of iron deficiency anemia.  She did not have any improvement with oral iron supplements.  She received IV iron infusion with improvement.  Hemoglobin improved to 11.5 with ferritin 15.4.  Today-patient is complaining of increased fatigue.  She has been experiencing abnormal uterine bleeding with heavy periods.  She is following with gynecology and is tentatively being scheduled for hysterectomy in March 2025.    Patient is also complaining of difficulty sleeping.  States that she has tried melatonin with no success.  She was prescribed trazodone as needed with improvement.    Vitamin D deficiency: Vitamin D level was 32     Obesity: Patient is s/p Salma-en-Y gastric bypass on 5/15/2023. Weight on the day of surgery was 309 lbs. Today, patient is 207 lbs.     Patient is now perimenopausal.    She was started on gabapentin for hot flashes which helped initially but feels it is not as effective.    Patient is complaining of intermittent abdominal pain.  She also has a history of fatty liver      Review of Systems   Constitutional:  Positive for fatigue. Negative for activity change, appetite change, chills, diaphoresis, fever and unexpected weight change.   HENT:  Negative for congestion, nosebleeds, postnasal drip and rhinorrhea.    Respiratory:  Negative for cough, chest tightness, shortness of breath and wheezing.

## 2024-12-07 ENCOUNTER — HOSPITAL ENCOUNTER (OUTPATIENT)
Facility: HOSPITAL | Age: 44
Discharge: HOME | End: 2024-12-10
Payer: COMMERCIAL

## 2024-12-07 DIAGNOSIS — R10.84 GENERALIZED ABDOMINAL PAIN: ICD-10-CM

## 2024-12-07 PROCEDURE — 76705 ECHO EXAM OF ABDOMEN: CPT

## 2025-04-01 SDOH — ECONOMIC STABILITY: FOOD INSECURITY: WITHIN THE PAST 12 MONTHS, THE FOOD YOU BOUGHT JUST DIDN'T LAST AND YOU DIDN'T HAVE MONEY TO GET MORE.: NEVER TRUE

## 2025-04-01 SDOH — ECONOMIC STABILITY: FOOD INSECURITY: WITHIN THE PAST 12 MONTHS, YOU WORRIED THAT YOUR FOOD WOULD RUN OUT BEFORE YOU GOT MONEY TO BUY MORE.: NEVER TRUE

## 2025-04-01 SDOH — ECONOMIC STABILITY: INCOME INSECURITY: IN THE LAST 12 MONTHS, WAS THERE A TIME WHEN YOU WERE NOT ABLE TO PAY THE MORTGAGE OR RENT ON TIME?: NO

## 2025-04-01 SDOH — ECONOMIC STABILITY: TRANSPORTATION INSECURITY
IN THE PAST 12 MONTHS, HAS THE LACK OF TRANSPORTATION KEPT YOU FROM MEDICAL APPOINTMENTS OR FROM GETTING MEDICATIONS?: NO

## 2025-04-04 ENCOUNTER — OFFICE VISIT (OUTPATIENT)
Facility: CLINIC | Age: 45
End: 2025-04-04
Payer: COMMERCIAL

## 2025-04-04 VITALS
HEART RATE: 74 BPM | HEIGHT: 64 IN | DIASTOLIC BLOOD PRESSURE: 74 MMHG | SYSTOLIC BLOOD PRESSURE: 109 MMHG | OXYGEN SATURATION: 98 % | BODY MASS INDEX: 35.82 KG/M2 | WEIGHT: 209.8 LBS

## 2025-04-04 DIAGNOSIS — D50.0 IRON DEFICIENCY ANEMIA SECONDARY TO BLOOD LOSS (CHRONIC): Primary | ICD-10-CM

## 2025-04-04 DIAGNOSIS — R23.2 HOT FLASHES: ICD-10-CM

## 2025-04-04 DIAGNOSIS — I10 ESSENTIAL (PRIMARY) HYPERTENSION: ICD-10-CM

## 2025-04-04 DIAGNOSIS — R53.82 CHRONIC FATIGUE: ICD-10-CM

## 2025-04-04 DIAGNOSIS — Z98.84 STATUS POST BARIATRIC SURGERY: ICD-10-CM

## 2025-04-04 DIAGNOSIS — E55.9 VITAMIN D DEFICIENCY: ICD-10-CM

## 2025-04-04 PROCEDURE — 3074F SYST BP LT 130 MM HG: CPT | Performed by: STUDENT IN AN ORGANIZED HEALTH CARE EDUCATION/TRAINING PROGRAM

## 2025-04-04 PROCEDURE — 3078F DIAST BP <80 MM HG: CPT | Performed by: STUDENT IN AN ORGANIZED HEALTH CARE EDUCATION/TRAINING PROGRAM

## 2025-04-04 PROCEDURE — 99214 OFFICE O/P EST MOD 30 MIN: CPT | Performed by: STUDENT IN AN ORGANIZED HEALTH CARE EDUCATION/TRAINING PROGRAM

## 2025-04-04 RX ORDER — FERROUS SULFATE 325(65) MG
325 TABLET ORAL
Qty: 90 TABLET | Refills: 1 | Status: SHIPPED | OUTPATIENT
Start: 2025-04-04

## 2025-04-04 ASSESSMENT — PATIENT HEALTH QUESTIONNAIRE - PHQ9
1. LITTLE INTEREST OR PLEASURE IN DOING THINGS: NOT AT ALL
SUM OF ALL RESPONSES TO PHQ QUESTIONS 1-9: 0
2. FEELING DOWN, DEPRESSED OR HOPELESS: NOT AT ALL
SUM OF ALL RESPONSES TO PHQ QUESTIONS 1-9: 0

## 2025-04-04 ASSESSMENT — ENCOUNTER SYMPTOMS
COUGH: 0
BLOOD IN STOOL: 0
CHEST TIGHTNESS: 0
WHEEZING: 0
RHINORRHEA: 0
ROS SKIN COMMENTS: HAIR LOSS
NAUSEA: 0
VOMITING: 0
ABDOMINAL PAIN: 1
DIARRHEA: 0
BACK PAIN: 0
SHORTNESS OF BREATH: 0

## 2025-04-04 NOTE — PROGRESS NOTES
Lay Carrillo is a 45 y.o. year old female who presents today for No chief complaint on file.      Is someone accompanying this pt? No    Is the patient using any DME equipment during OV? No    Depression Screenin/4/2025     7:45 AM 2024     7:56 AM 2024     8:24 AM 2024     8:32 AM 2024     7:51 AM 10/6/2023     8:01 AM 2023     8:23 AM   PHQ-9 Questionaire   Little interest or pleasure in doing things 0 0 0 0 0 0 0   Feeling down, depressed, or hopeless 0 0 0 0 0 0 0   Trouble falling or staying asleep, or sleeping too much  0 0 0 0 0 0   Feeling tired or having little energy  0 0 0 0 0 0   Poor appetite or overeating  0 0 0 0 0 0   Feeling bad about yourself - or that you are a failure or have let yourself or your family down  0 0 0 0 0 0   Trouble concentrating on things, such as reading the newspaper or watching television  0 0 0 0 0 0   Moving or speaking so slowly that other people could have noticed. Or the opposite - being so fidgety or restless that you have been moving around a lot more than usual  0 0 0 0 0 0   Thoughts that you would be better off dead, or of hurting yourself in some way  0 0 0 0 0 0   PHQ-9 Total Score 0 0 0 0 0 0 0   If you checked off any problems, how difficult have these problems made it for you to do your work, take care of things at home, or get along with other people?  0 0 0 0 0 0       Abuse Screening:       No data to display                Learning Assessment:  No question data found.    Fall Risk:       No data to display                    Coordination of Care:   1. \"Have you been to the ER, urgent care clinic since your last visit?  Hospitalized since your last visit?\" N/A    2. \"Have you seen or consulted any other health care providers outside of the [facility] since your last visit?\" Yes     3. For patients aged 45-75: Has the patient had a colonoscopy / FIT/ Cologuard? N/A     If the patient is female:    4. For

## 2025-04-04 NOTE — PROGRESS NOTES
Lay Carrillo is a 45 y.o. female presenting today for Follow-up  .     Chief Complaint   Patient presents with    Follow-up       HPI:  Lay Carrillo presents to the office today for follow up.    Patient has a PMHx of Obesity s/p gastric bypass in 5/2023, HTN, iron deficiency anemia, eczema, fatty liver.     HTN: Patient is no longer on the amlodipine.  Her blood pressure improved with weight loss.  BP today is 109/74     Itchy skin: Patient saw dermatology.    Taking hydroxyzine as needed.  She has been prescribed nystatin powder for intertrigo.     Anemia: Patient has a history of iron deficiency anemia.  She did not have any improvement with oral iron supplements.  She received IV iron infusion with improvement.  Hemoglobin improved to 11.5 with ferritin 15.4.  Patient is complaining of increased fatigue.  She has been experiencing abnormal uterine bleeding with heavy periods.  Patient is s/p hysterectomy in 3/2025.  Labs in in 3/2025 showed hemoglobin 10.  Patient continues to complain of fatigue, cold feet.  She is currently not taking any iron supplements.    Patient is also complaining of difficulty sleeping.  States that she has tried melatonin with no success.  She was prescribed trazodone as needed with improvement.    Vitamin D deficiency: Vitamin D level was 32     Obesity: Patient is s/p Salma-en-Y gastric bypass on 5/15/2023. Weight on the day of surgery was 309 lbs. Today, patient is 207 lbs.     Patient is now perimenopausal.    She was started on gabapentin for hot flashes but has not been effective.  She has a follow-up appointment with gynecology-May consider    Patient is complaining of intermittent abdominal pain.  She also has a history of fatty liver      Review of Systems   Constitutional:  Positive for fatigue. Negative for activity change, appetite change, chills, diaphoresis, fever and unexpected weight change.   HENT:  Negative for congestion, nosebleeds, postnasal drip and

## 2025-04-11 ENCOUNTER — TELEPHONE (OUTPATIENT)
Facility: CLINIC | Age: 45
End: 2025-04-11

## 2025-04-11 DIAGNOSIS — D50.0 IRON DEFICIENCY ANEMIA SECONDARY TO BLOOD LOSS (CHRONIC): ICD-10-CM

## 2025-04-11 RX ORDER — FERROUS SULFATE 325(65) MG
325 TABLET ORAL
Qty: 90 TABLET | Refills: 1 | Status: SHIPPED | OUTPATIENT
Start: 2025-04-11

## 2025-04-11 NOTE — TELEPHONE ENCOUNTER
Pt request refill on medication     Ferrous sulfate (IRON 325) 325 (65 Fe) MG tablet       Location     [facility]

## 2025-07-31 ENCOUNTER — OFFICE VISIT (OUTPATIENT)
Facility: CLINIC | Age: 45
End: 2025-07-31
Payer: COMMERCIAL

## 2025-07-31 VITALS
TEMPERATURE: 99 F | SYSTOLIC BLOOD PRESSURE: 101 MMHG | OXYGEN SATURATION: 99 % | HEIGHT: 64 IN | WEIGHT: 209.4 LBS | DIASTOLIC BLOOD PRESSURE: 65 MMHG | BODY MASS INDEX: 35.75 KG/M2 | HEART RATE: 82 BPM

## 2025-07-31 DIAGNOSIS — R68.89 FLU-LIKE SYMPTOMS: ICD-10-CM

## 2025-07-31 DIAGNOSIS — J01.90 ACUTE VIRAL SINUSITIS: ICD-10-CM

## 2025-07-31 DIAGNOSIS — B97.89 ACUTE VIRAL SINUSITIS: ICD-10-CM

## 2025-07-31 DIAGNOSIS — U07.1 COVID-19: Primary | ICD-10-CM

## 2025-07-31 LAB
EXP DATE SOLUTION: ABNORMAL
EXP DATE SWAB: ABNORMAL
EXPIRATION DATE: ABNORMAL
INFLUENZA A ANTIGEN, POC: NEGATIVE
INFLUENZA B ANTIGEN, POC: NEGATIVE
LOT NUMBER POC: ABNORMAL
LOT NUMBER SOLUTION: ABNORMAL
LOT NUMBER SWAB: ABNORMAL
SARS-COV-2 RNA, POC: POSITIVE
VALID INTERNAL CONTROL, POC: NORMAL

## 2025-07-31 PROCEDURE — G2211 COMPLEX E/M VISIT ADD ON: HCPCS | Performed by: STUDENT IN AN ORGANIZED HEALTH CARE EDUCATION/TRAINING PROGRAM

## 2025-07-31 PROCEDURE — 87804 INFLUENZA ASSAY W/OPTIC: CPT | Performed by: STUDENT IN AN ORGANIZED HEALTH CARE EDUCATION/TRAINING PROGRAM

## 2025-07-31 PROCEDURE — 99213 OFFICE O/P EST LOW 20 MIN: CPT | Performed by: STUDENT IN AN ORGANIZED HEALTH CARE EDUCATION/TRAINING PROGRAM

## 2025-07-31 PROCEDURE — 3078F DIAST BP <80 MM HG: CPT | Performed by: STUDENT IN AN ORGANIZED HEALTH CARE EDUCATION/TRAINING PROGRAM

## 2025-07-31 PROCEDURE — 3074F SYST BP LT 130 MM HG: CPT | Performed by: STUDENT IN AN ORGANIZED HEALTH CARE EDUCATION/TRAINING PROGRAM

## 2025-07-31 PROCEDURE — 87635 SARS-COV-2 COVID-19 AMP PRB: CPT | Performed by: STUDENT IN AN ORGANIZED HEALTH CARE EDUCATION/TRAINING PROGRAM

## 2025-07-31 SDOH — ECONOMIC STABILITY: FOOD INSECURITY: WITHIN THE PAST 12 MONTHS, YOU WORRIED THAT YOUR FOOD WOULD RUN OUT BEFORE YOU GOT MONEY TO BUY MORE.: NEVER TRUE

## 2025-07-31 ASSESSMENT — PATIENT HEALTH QUESTIONNAIRE - PHQ9
SUM OF ALL RESPONSES TO PHQ QUESTIONS 1-9: 0
1. LITTLE INTEREST OR PLEASURE IN DOING THINGS: NOT AT ALL
2. FEELING DOWN, DEPRESSED OR HOPELESS: NOT AT ALL

## 2025-07-31 ASSESSMENT — ENCOUNTER SYMPTOMS
COUGH: 1
SINUS PAIN: 1
DIARRHEA: 0
RHINORRHEA: 1
SHORTNESS OF BREATH: 0
NAUSEA: 0
CHEST TIGHTNESS: 0
BLOOD IN STOOL: 0
BACK PAIN: 0
SINUS PRESSURE: 1
VOMITING: 0
WHEEZING: 0
ROS SKIN COMMENTS: HAIR LOSS

## 2025-07-31 NOTE — PROGRESS NOTES
Lay Carrillo is a 45 y.o. female presenting today for Other (Patient presents in office with complaints of Headache, sinus congestion,  chills and body aches. Symptoms have been present approx 3 days. )  .     Chief Complaint   Patient presents with    Other     Patient presents in office with complaints of Headache, sinus congestion,  chills and body aches. Symptoms have been present approx 3 days.        HPI:  Lay Carrillo presents to the office today for acute visit.    Patient has a PMHx of Obesity s/p gastric bypass in 5/2023, HTN, iron deficiency anemia, eczema, fatty liver.    Patient presents today complaining of headache, sinus congestion, chills, body aches.  Reports that symptoms started on Tuesday.  She has been taking Mucinex, Flonase and cough drops.  Feels very congested.     HTN: Patient is no longer on the amlodipine.  Her blood pressure improved with weight loss.  BP today is 109/74     Itchy skin: Patient saw dermatology.    Taking hydroxyzine as needed.  She has been prescribed nystatin powder for intertrigo.     Anemia: Patient has a history of iron deficiency anemia.  She did not have any improvement with oral iron supplements.  She received IV iron infusion with improvement.  Hemoglobin improved to 11.5 with ferritin 15.4.  Patient is complaining of increased fatigue.  She has been experiencing abnormal uterine bleeding with heavy periods.  Patient is s/p hysterectomy in 3/2025.  Labs in in 3/2025 showed hemoglobin 10.  Patient continues to complain of fatigue, cold feet.  She is currently not taking any iron supplements.  Repeat labs are pending.    Patient is also complaining of difficulty sleeping.  States that she has tried melatonin with no success.  She was prescribed trazodone as needed with improvement.    Vitamin D deficiency: Vitamin D level was 32     Obesity: Patient is s/p Salma-en-Y gastric bypass on 5/15/2023. Weight on the day of surgery was 309 lbs. Today,

## 2025-08-08 ENCOUNTER — OFFICE VISIT (OUTPATIENT)
Facility: CLINIC | Age: 45
End: 2025-08-08
Payer: COMMERCIAL

## 2025-08-08 VITALS
WEIGHT: 209 LBS | DIASTOLIC BLOOD PRESSURE: 71 MMHG | HEIGHT: 64 IN | SYSTOLIC BLOOD PRESSURE: 108 MMHG | HEART RATE: 71 BPM | OXYGEN SATURATION: 100 % | BODY MASS INDEX: 35.68 KG/M2

## 2025-08-08 DIAGNOSIS — Z98.84 STATUS POST BARIATRIC SURGERY: ICD-10-CM

## 2025-08-08 DIAGNOSIS — E55.9 VITAMIN D DEFICIENCY: ICD-10-CM

## 2025-08-08 DIAGNOSIS — I10 ESSENTIAL (PRIMARY) HYPERTENSION: ICD-10-CM

## 2025-08-08 DIAGNOSIS — G47.00 INSOMNIA, UNSPECIFIED TYPE: Primary | ICD-10-CM

## 2025-08-08 DIAGNOSIS — K76.0 FATTY LIVER: ICD-10-CM

## 2025-08-08 DIAGNOSIS — D50.0 IRON DEFICIENCY ANEMIA SECONDARY TO BLOOD LOSS (CHRONIC): ICD-10-CM

## 2025-08-08 PROCEDURE — 99214 OFFICE O/P EST MOD 30 MIN: CPT | Performed by: STUDENT IN AN ORGANIZED HEALTH CARE EDUCATION/TRAINING PROGRAM

## 2025-08-08 PROCEDURE — 3074F SYST BP LT 130 MM HG: CPT | Performed by: STUDENT IN AN ORGANIZED HEALTH CARE EDUCATION/TRAINING PROGRAM

## 2025-08-08 PROCEDURE — 3078F DIAST BP <80 MM HG: CPT | Performed by: STUDENT IN AN ORGANIZED HEALTH CARE EDUCATION/TRAINING PROGRAM

## 2025-08-08 PROCEDURE — G2211 COMPLEX E/M VISIT ADD ON: HCPCS | Performed by: STUDENT IN AN ORGANIZED HEALTH CARE EDUCATION/TRAINING PROGRAM

## 2025-08-08 RX ORDER — ESTRADIOL 0.05 MG/D
1 PATCH, EXTENDED RELEASE TRANSDERMAL
COMMUNITY
Start: 2025-07-23

## 2025-08-08 RX ORDER — TRAZODONE HYDROCHLORIDE 100 MG/1
100 TABLET ORAL NIGHTLY PRN
Qty: 90 TABLET | Refills: 1 | Status: SHIPPED | OUTPATIENT
Start: 2025-08-08

## 2025-08-08 SDOH — ECONOMIC STABILITY: FOOD INSECURITY: WITHIN THE PAST 12 MONTHS, YOU WORRIED THAT YOUR FOOD WOULD RUN OUT BEFORE YOU GOT MONEY TO BUY MORE.: NEVER TRUE

## 2025-08-08 SDOH — ECONOMIC STABILITY: FOOD INSECURITY: WITHIN THE PAST 12 MONTHS, THE FOOD YOU BOUGHT JUST DIDN'T LAST AND YOU DIDN'T HAVE MONEY TO GET MORE.: NEVER TRUE

## 2025-08-08 ASSESSMENT — ENCOUNTER SYMPTOMS
ABDOMINAL PAIN: 0
DIARRHEA: 0
RHINORRHEA: 0
SHORTNESS OF BREATH: 0
ROS SKIN COMMENTS: HAIR LOSS
VOMITING: 0
BACK PAIN: 0
NAUSEA: 0
CHEST TIGHTNESS: 0
WHEEZING: 0
COUGH: 1
BLOOD IN STOOL: 0

## 2025-08-08 ASSESSMENT — PATIENT HEALTH QUESTIONNAIRE - PHQ9
1. LITTLE INTEREST OR PLEASURE IN DOING THINGS: NOT AT ALL
SUM OF ALL RESPONSES TO PHQ QUESTIONS 1-9: 0